# Patient Record
Sex: FEMALE | Race: WHITE | Employment: OTHER | URBAN - METROPOLITAN AREA
[De-identification: names, ages, dates, MRNs, and addresses within clinical notes are randomized per-mention and may not be internally consistent; named-entity substitution may affect disease eponyms.]

---

## 2024-03-27 ENCOUNTER — OFFICE VISIT (OUTPATIENT)
Dept: OTOLARYNGOLOGY | Facility: CLINIC | Age: 69
End: 2024-03-27
Payer: MEDICARE

## 2024-03-27 VITALS — WEIGHT: 183 LBS

## 2024-03-27 DIAGNOSIS — H72.92 PERFORATION OF LEFT TYMPANIC MEMBRANE: Primary | ICD-10-CM

## 2024-03-27 DIAGNOSIS — H90.A32 MIXED CONDUCTIVE AND SENSORINEURAL HEARING LOSS OF LEFT EAR WITH RESTRICTED HEARING OF RIGHT EAR: ICD-10-CM

## 2024-03-27 DIAGNOSIS — H66.3X2 CHRONIC SUPPURATIVE OTITIS MEDIA OF LEFT EAR, UNSPECIFIED OTITIS MEDIA LOCATION: ICD-10-CM

## 2024-03-27 PROCEDURE — 99204 OFFICE O/P NEW MOD 45 MIN: CPT | Performed by: OTOLARYNGOLOGY

## 2024-03-27 PROCEDURE — 1159F MED LIST DOCD IN RCRD: CPT | Performed by: OTOLARYNGOLOGY

## 2024-03-27 PROCEDURE — 1160F RVW MEDS BY RX/DR IN RCRD: CPT | Performed by: OTOLARYNGOLOGY

## 2024-03-27 RX ORDER — FLUTICASONE PROPIONATE 50 MCG
SPRAY, SUSPENSION (ML) NASAL
COMMUNITY

## 2024-03-27 RX ORDER — OXYBUTYNIN CHLORIDE 10 MG/1
10 TABLET, EXTENDED RELEASE ORAL DAILY
COMMUNITY

## 2024-03-27 RX ORDER — ROSUVASTATIN CALCIUM 10 MG/1
10 TABLET, COATED ORAL
COMMUNITY
Start: 2024-01-17

## 2024-03-27 RX ORDER — OMEPRAZOLE 40 MG/1
40 CAPSULE, DELAYED RELEASE ORAL DAILY
COMMUNITY

## 2024-03-27 RX ORDER — CIPROFLOXACIN AND DEXAMETHASONE 3; 1 MG/ML; MG/ML
SUSPENSION/ DROPS AURICULAR (OTIC)
COMMUNITY
Start: 2024-01-18 | End: 2024-05-16 | Stop reason: HOSPADM

## 2024-03-27 RX ORDER — METFORMIN HYDROCHLORIDE 500 MG/1
500 TABLET, EXTENDED RELEASE ORAL
COMMUNITY
Start: 2024-01-17 | End: 2024-04-16

## 2024-03-27 RX ORDER — ALBUTEROL SULFATE 90 UG/1
AEROSOL, METERED RESPIRATORY (INHALATION)
COMMUNITY
Start: 2022-06-02

## 2024-03-27 RX ORDER — HYDROCHLOROTHIAZIDE 25 MG/1
TABLET ORAL
COMMUNITY
Start: 2023-09-18

## 2024-03-27 RX ORDER — METOPROLOL SUCCINATE 100 MG/1
TABLET, EXTENDED RELEASE ORAL
COMMUNITY
Start: 2023-09-18

## 2024-03-27 ASSESSMENT — PATIENT HEALTH QUESTIONNAIRE - PHQ9
1. LITTLE INTEREST OR PLEASURE IN DOING THINGS: NOT AT ALL
2. FEELING DOWN, DEPRESSED OR HOPELESS: NOT AT ALL
SUM OF ALL RESPONSES TO PHQ9 QUESTIONS 1 AND 2: 0

## 2024-03-27 NOTE — PROGRESS NOTES
Reason for Consult:  Left  tympanic membrane perforation      Subjective   History Of Present Illness:  Wendy Ayala is a 68 y.o. female who presents for evaluation for left ear drum perforation. Pt with PMHx of right meningocele/CSF leak repaired about 5 years ago and subsequent tympanoplasty by Dr. Forbes. About two years ago started having recurrent fluid/infection in left ear for which a tube was placed by Dr. Zuleta. Tube initially helped but began getting infected and was then removed about 5 weeks ago. On re-evaluation Dr. Zuleta noticed persistent perforation of left ear and was sent for further eval and possible tympanoplasty. Pt currently denies otorrhea, dizzines or vestibular symptoms. She wears hearing aids for hearing loss.      Past Medical History:  She has no past medical history on file.    Surgical History:  She has no past surgical history on file.     Social History:  She reports that she has never smoked. She has been exposed to tobacco smoke. She has never used smokeless tobacco. No history on file for alcohol use and drug use.    Family History:  family history is not on file.     Medications:  Current Outpatient Medications   Medication Instructions    albuterol 90 mcg/actuation inhaler INHALE 2 PUFFS BY MOUTH EVERY 4 HOURS as instructed AS NEEDED FOR WHEEZING, SHORTNESS OF BREATH    ciprofloxacin-dexamethasone (CiproDEX) otic suspension INSTILL 4 DROPS INTO AFFECTED EAR TWICE DAILY FOR 7 DAYS    fluticasone (Flonase) 50 mcg/actuation nasal spray 16    hydroCHLOROthiazide (HYDRODiuril) 25 mg tablet 90    metFORMIN XR (GLUCOPHAGE-XR) 500 mg, oral, Daily RT    metoprolol succinate XL (Toprol-XL) 100 mg 24 hr tablet 90    omeprazole (PRILOSEC) 40 mg, oral, Daily    oxybutynin XL (DITROPAN-XL) 10 mg, oral, Daily    rosuvastatin (CRESTOR) 10 mg, oral, Daily RT      Allergies:  Patient has no known allergies.    Review of Systems:   A comprehensive 10-point review of systems was  obtained including constitutional, neurological, HEENT, pulmonary, cardiovascular, genito-urinary, and other pertinent systems and was negative except as noted in the HPI.     Objective   Physical Exam:  Last Recorded Vitals: Weight 83 kg (183 lb).    On physical exam, the patient is a well-nourished, well-developed patient, in no acute distress, able to communicate without assistance in English language. Head and face is atraumatic and normocephalic. Salivary glands are intact. Facial strength is symmetrical bilaterally.       On ear examination:  Right ear: The patient has cerumen impaction removed. Once removed he has an open and patent ear canal. The tympanic membrane is intact s/p tympanoplasty. AC>BC.  Left ear: The patient has an open and patent ear canal. The tympanic membrane  has a 50% anterior perforation . BC>AC  [place] is left.    The rest of the exam, including anterior rhinoscopy, oropharyngeal exam, neck exam, and cardiovascular exam, were normal including no palpable lymphadenopathies, thyroid in the midline position, normal pulses, and normal chest excursion.       Reviewed Results:  Audiology Testing:   I personally reviewed the audiogram from 01/2024 from Dr. Zuleta which showed a moderate severe sloping to severe mixed hearing loss in the left ear. And 20-25 dB of air-bone gap. The right ear was not tested.        Imaging:  Has not had recent CT scan        Procedure:  A microscope was used to visualize the ear canal. Mild cerumen removed from right ear with alligator forceps. The left ear cerumen was cleared with alligator forceps and rothman needle. All anatomical structures were intact following the procedure. Pt tolerated well.     Assessment/Plan     1. Perforation of left tympanic membrane    2. Chronic suppurative otitis media of left ear, unspecified otitis media location    3. 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   Given her hx of encephalocele and CSF leak repair by Timo Santana on right and tympanic membrane perforation on left, we will obtain CT IAC to rule out any middle ear pathology. She will likely need tympanoplasty on left side. Follow up in 3 weeks virtually to review CT IAC and discuss surgery.         ____________________________________________________  Richie Javier MD  Professor and Chief   Otology/Neurotology/Lateral Skull-Base Surgery   [facility]

## 2024-03-27 NOTE — LETTER
March 31, 2024     Jose Zuleta MD  [address]  [facility] [address]    Patient: Wendy Ayala   YOB: 1955   Date of Visit: 3/27/2024       Dear Dr. Jose Zuleta MD:    Thank you for referring Wendy Ayala to me for evaluation. Below are my notes for this consultation.  If you have questions, please do not hesitate to call me. I look forward to following your patient along with you.       Sincerely,     Richie Rasmussen MD      CC: Fide Roca MD  ______________________________________________________________________________________            Reason for Consult:  Left  tympanic membrane perforation      Subjective  History Of Present Illness:  Wendy Ayala is a 68 y.o. female who presents for evaluation for left ear drum perforation. Pt with PMHx of right meningocele/CSF leak repaired about 5 years ago and subsequent tympanoplasty by Dr. Forbes. About two years ago started having recurrent fluid/infection in left ear for which a tube was placed by Dr. Zuleta. Tube initially helped but began getting infected and was then removed about 5 weeks ago. On re-evaluation Dr. Zuleta noticed persistent perforation of left ear and was sent for further eval and possible tympanoplasty. Pt currently denies otorrhea, dizzines or vestibular symptoms. She wears hearing aids for hearing loss.      Past Medical History:  She has no past medical history on file.    Surgical History:  She has no past surgical history on file.     Social History:  She reports that she has never smoked. She has been exposed to tobacco smoke. She has never used smokeless tobacco. No history on file for alcohol use and drug use.    Family History:  family history is not on file.     Medications:  Current Outpatient Medications   Medication Instructions   • albuterol 90 mcg/actuation inhaler INHALE 2 PUFFS BY MOUTH EVERY 4 HOURS as instructed AS NEEDED FOR WHEEZING, SHORTNESS OF BREATH   •  ciprofloxacin-dexamethasone (CiproDEX) otic suspension INSTILL 4 DROPS INTO AFFECTED EAR TWICE DAILY FOR 7 DAYS   • fluticasone (Flonase) 50 mcg/actuation nasal spray 16   • hydroCHLOROthiazide (HYDRODiuril) 25 mg tablet 90   • metFORMIN XR (GLUCOPHAGE-XR) 500 mg, oral, Daily RT   • metoprolol succinate XL (Toprol-XL) 100 mg 24 hr tablet 90   • omeprazole (PRILOSEC) 40 mg, oral, Daily   • oxybutynin XL (DITROPAN-XL) 10 mg, oral, Daily   • rosuvastatin (CRESTOR) 10 mg, oral, Daily RT      Allergies:  Patient has no known allergies.    Review of Systems:   A comprehensive 10-point review of systems was obtained including constitutional, neurological, HEENT, pulmonary, cardiovascular, genito-urinary, and other pertinent systems and was negative except as noted in the HPI.     Objective  Physical Exam:  Last Recorded Vitals: Weight 83 kg (183 lb).    On physical exam, the patient is a well-nourished, well-developed patient, in no acute distress, able to communicate without assistance in English language. Head and face is atraumatic and normocephalic. Salivary glands are intact. Facial strength is symmetrical bilaterally.       On ear examination:  Right ear: The patient has cerumen impaction removed. Once removed he has an open and patent ear canal. The tympanic membrane is intact s/p tympanoplasty. AC>BC.  Left ear: The patient has an open and patent ear canal. The tympanic membrane  has a 50% anterior perforation . BC>AC  [place] is left.    The rest of the exam, including anterior rhinoscopy, oropharyngeal exam, neck exam, and cardiovascular exam, were normal including no palpable lymphadenopathies, thyroid in the midline position, normal pulses, and normal chest excursion.       Reviewed Results:  Audiology Testing:   I personally reviewed the audiogram from 01/2024 from Dr. Zuleta which showed a moderate severe sloping to severe mixed hearing loss in the left ear. And 20-25 dB of air-bone gap. The right ear was not  tested.        Imaging:  Has not had recent CT scan        Procedure:  A microscope was used to visualize the ear canal. Mild cerumen removed from right ear with alligator forceps. The left ear cerumen was cleared with alligator forceps and rothman needle. All anatomical structures were intact following the procedure. Pt tolerated well.     Assessment/Plan    1. Perforation of left tympanic membrane    2. Chronic suppurative otitis media of left ear, unspecified otitis media location    3. Mixed conductive and sensorineural hearing loss of left ear with restricted hearing of right ear        In summary, Wendy Ayala is a 68 y.o. 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   Given her hx of encephalocele and CSF leak repair by Timo Santana on right and tympanic membrane perforation on left, we will obtain CT IAC to rule out any middle ear pathology. She will likely need tympanoplasty on left side. Follow up in 3 weeks virtually to review CT IAC and discuss surgery.         ____________________________________________________  Richie Javier MD  Professor and Chief   Otology/Neurotology/Lateral Skull-Base Surgery   [facility]

## 2024-04-10 ENCOUNTER — HOSPITAL ENCOUNTER (OUTPATIENT)
Dept: RADIOLOGY | Facility: CLINIC | Age: 69
Discharge: HOME | End: 2024-04-10
Payer: MEDICARE

## 2024-04-10 DIAGNOSIS — H66.3X2 CHRONIC SUPPURATIVE OTITIS MEDIA OF LEFT EAR, UNSPECIFIED OTITIS MEDIA LOCATION: ICD-10-CM

## 2024-04-10 PROCEDURE — 70480 CT ORBIT/EAR/FOSSA W/O DYE: CPT

## 2024-04-10 PROCEDURE — 70480 CT ORBIT/EAR/FOSSA W/O DYE: CPT | Performed by: RADIOLOGY

## 2024-04-13 NOTE — RESULT ENCOUNTER NOTE
I reviewed The CT of the IAC that showed the previous craniotomy repair on the right side.  She seems to have , bilateral superior canal dehiscence worse on the right than the left. This could explain her mixed hearing loss in addition to the tympanic membrane perforation. He ossicles are intact.    I will discuss results with the patient in her follow up appt. And discuss plan. She will need a New Audio in her next appt. (04/22) please schedule.

## 2024-04-16 ENCOUNTER — CLINICAL SUPPORT (OUTPATIENT)
Dept: AUDIOLOGY | Facility: CLINIC | Age: 69
End: 2024-04-16
Payer: MEDICARE

## 2024-04-16 DIAGNOSIS — H72.92 PERFORATION OF LEFT TYMPANIC MEMBRANE: ICD-10-CM

## 2024-04-16 DIAGNOSIS — H90.3 SENSORINEURAL HEARING LOSS (SNHL) OF BOTH EARS: Primary | ICD-10-CM

## 2024-04-16 PROCEDURE — 92557 COMPREHENSIVE HEARING TEST: CPT | Performed by: AUDIOLOGIST

## 2024-04-16 PROCEDURE — 92567 TYMPANOMETRY: CPT | Performed by: AUDIOLOGIST

## 2024-04-16 NOTE — PROGRESS NOTES
Name: Wendy Ayala  YOB: 1955  Age: 68 y.o.    Date of Evaluation:  4/16/2024      History:      Patient presents today for hearing test. Will see ENT as scheduled virtually 4/22/24.  Patient has history of left eardrum perforation, known bilateral hearing loss.   History of CSF leak, repair.   Wears hearing aids bilaterally.  Denies tinnitus, vertigo.     Evaluation:    Otoscopy revealed clear ear canals bilaterally.  Left tympanic membrane perforation visualized.  Immittance testing revealed normal middle ear function right ear, flat Type B tympanogram with large ear canal volume left ear.  Right ear: mild/moderate  to severe sensorineural hearing loss with good word discrimination (84%)  Left ear: mild/moderate  to profound sensorineural hearing loss with poor word discrimination (44%)      Treatment Plan:    - Follow up with Dr. Javier  - Continue amplification use as directed  - Retest hearing in conjunction with otologic management      [id number]    Ramon Staley, CCC-A

## 2024-04-22 ENCOUNTER — TELEMEDICINE (OUTPATIENT)
Dept: OTOLARYNGOLOGY | Facility: CLINIC | Age: 69
End: 2024-04-22
Payer: MEDICARE

## 2024-04-22 DIAGNOSIS — Z01.818 PREOPERATIVE TESTING: ICD-10-CM

## 2024-04-22 DIAGNOSIS — H72.92 PERFORATION OF LEFT TYMPANIC MEMBRANE: Primary | ICD-10-CM

## 2024-04-22 DIAGNOSIS — H90.A21 SENSORINEURAL HEARING LOSS (SNHL) OF RIGHT EAR WITH RESTRICTED HEARING OF LEFT EAR: ICD-10-CM

## 2024-04-22 DIAGNOSIS — H90.A32 MIXED CONDUCTIVE AND SENSORINEURAL HEARING LOSS OF LEFT EAR WITH RESTRICTED HEARING OF RIGHT EAR: ICD-10-CM

## 2024-04-22 DIAGNOSIS — H83.8X3 SUPERIOR SEMICIRCULAR CANAL DEHISCENCE OF BOTH EARS: ICD-10-CM

## 2024-04-22 PROCEDURE — 1159F MED LIST DOCD IN RCRD: CPT | Performed by: OTOLARYNGOLOGY

## 2024-04-22 PROCEDURE — 99214 OFFICE O/P EST MOD 30 MIN: CPT | Performed by: OTOLARYNGOLOGY

## 2024-04-22 PROCEDURE — 1036F TOBACCO NON-USER: CPT | Performed by: OTOLARYNGOLOGY

## 2024-04-22 PROCEDURE — 1160F RVW MEDS BY RX/DR IN RCRD: CPT | Performed by: OTOLARYNGOLOGY

## 2024-04-22 RX ORDER — SODIUM CHLORIDE 9 MG/ML
100 INJECTION, SOLUTION INTRAVENOUS CONTINUOUS
Status: CANCELLED | OUTPATIENT
Start: 2024-04-22

## 2024-04-22 RX ORDER — CEFAZOLIN SODIUM 2 G/100ML
2 INJECTION, SOLUTION INTRAVENOUS ONCE
Status: CANCELLED | OUTPATIENT
Start: 2024-04-22 | End: 2024-04-22

## 2024-04-22 NOTE — PROGRESS NOTES
Reason for Consult:  Left  tympanic membrane perforation      Subjective   History Of Present Illness:  Wendy Ayala is a 68 y.o. female who presents for evaluation for left ear drum perforation. Pt with PMHx of right meningocele/CSF leak repaired about 5 years ago and subsequent tympanoplasty by Dr. Forbes. About two years ago started having recurrent fluid/infection in left ear for which a tube was placed by Dr. Zuleta. Tube initially helped but began getting infected and was then removed about 5 weeks ago. On re-evaluation Dr. Zuleta noticed persistent perforation of left ear and was sent for further eval and possible tympanoplasty. Pt currently denies otorrhea, dizzines or vestibular symptoms. She wears hearing aids for hearing loss.    In the last visit I ordered a CT and she is here to discuss results and plan.     Past Medical History:  She has no past medical history on file.    Surgical History:  She has no past surgical history on file.     Social History:  She reports that she has never smoked. She has been exposed to tobacco smoke. She has never used smokeless tobacco. No history on file for alcohol use and drug use.    Family History:  family history is not on file.     Medications:  Current Outpatient Medications   Medication Instructions    albuterol 90 mcg/actuation inhaler INHALE 2 PUFFS BY MOUTH EVERY 4 HOURS as instructed AS NEEDED FOR WHEEZING, SHORTNESS OF BREATH    ciprofloxacin-dexamethasone (CiproDEX) otic suspension INSTILL 4 DROPS INTO AFFECTED EAR TWICE DAILY FOR 7 DAYS    fluticasone (Flonase) 50 mcg/actuation nasal spray 16    hydroCHLOROthiazide (HYDRODiuril) 25 mg tablet 90    metFORMIN XR (GLUCOPHAGE-XR) 500 mg, oral, Daily RT    metoprolol succinate XL (Toprol-XL) 100 mg 24 hr tablet 90    omeprazole (PRILOSEC) 40 mg, oral, Daily    oxybutynin XL (DITROPAN-XL) 10 mg, oral, Daily    rosuvastatin (CRESTOR) 10 mg, oral, Daily RT      Allergies:  Patient has no known  allergies.    Review of Systems:   A comprehensive 10-point review of systems was obtained including constitutional, neurological, HEENT, pulmonary, cardiovascular, genito-urinary, and other pertinent systems and was negative except as noted in the HPI.     Objective   Physical Exam:  On physical exam, the patient is a well-nourished well-developed patient, in no acute distress able to communicate with assistance in English language.  Head and face is atraumatic and normocephalic, facial strength is symmetrical bilaterally.    On ear examination: Normal Pinna. No further examination performed as this was virtual visit.    On Neuro exam, the patient is alert and oriented x3, cranial nerves are grossly intact.    No further examination performed as this was a virtual visit.       Reviewed Results:  Audiology Testing:   I personally reviewed the audiogram from 04/2024 which showed a moderate severe sloping to severe mixed hearing loss bilateralwith  15 dB of air-bone gap in the low frequencies. She has 44% discrimination on the left and 84% on the right.       I personally reviewed the audiogram from 01/2024 from Dr. Zuleta which showed a moderate severe sloping to severe mixed hearing loss in the left ear. And 20-25 dB of air-bone gap. The right ear was not tested.        Imaging:  I reviewed The CT of the IAC from 04/2024 that showed the previous craniotomy repair on the right side.  She seems to have , bilateral superior canal dehiscence worse on the right than the left. This could explain her mixed hearing loss in addition to the tympanic membrane perforation. He ossicles are intact.        Procedure:  None    Assessment/Plan     1. Perforation of left tympanic membrane    2. 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 Sensorineural hearing loss (SNHL) of right ear with restricted hearing of left ear    4. 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    She has a hx of encephalocele and CSF leak repair by Timo Santana on right.    The recent CT showed bilateral superior canal dehiscences.  She also has 44% discrimination on the left compared to the right which is 84%.    I discussed with her and her  that fixing the perforation might improve some of the fullness sensation and pure tone hearing but it is unlikely that he will improve her speech understanding.  I also explained that there is a chance that the low frequency air-bone gap might not improve due to the superior canal dehiscence.    The patient is still interested in surgery and we will plan on doing a left-sided postauricular tympanoplasty with composite cartilage graft.  The risks, indications, and complications of surgery were discussed with the patient and she elected to proceed.  We will schedule this in near future.       ____________________________________________________  Richie Javier MD  Professor and Chief   Otology/Neurotology/Lateral Skull-Base Surgery   [facility]

## 2024-04-22 NOTE — Clinical Note
I was able to connect. No need to reschedule. She is ready for surgery. Please schedule in [facility] or [place]. Orders are in. [facility]

## 2024-04-22 NOTE — LETTER
April 22, 2024     Jose Zuleta MD  [address]  [facility] [address]    Patient: Wendy Ayala   YOB: 1955   Date of Visit: 4/22/2024       Dear Dr. Jose Zuleta MD:    Thank you for referring Wendy Ayala to me for evaluation. Below are my notes for this consultation.  If you have questions, please do not hesitate to call me. I look forward to following your patient along with you.       Sincerely,     Richie Rasmussen MD      CC: Fide Roca MD  ______________________________________________________________________________________            Reason for Consult:  Left  tympanic membrane perforation      Subjective  History Of Present Illness:  Wendy Ayala is a 68 y.o. female who presents for evaluation for left ear drum perforation. Pt with PMHx of right meningocele/CSF leak repaired about 5 years ago and subsequent tympanoplasty by Dr. Forbes. About two years ago started having recurrent fluid/infection in left ear for which a tube was placed by Dr. Zuleta. Tube initially helped but began getting infected and was then removed about 5 weeks ago. On re-evaluation Dr. Zuleta noticed persistent perforation of left ear and was sent for further eval and possible tympanoplasty. Pt currently denies otorrhea, dizzines or vestibular symptoms. She wears hearing aids for hearing loss.    In the last visit I ordered a CT and she is here to discuss results and plan.     Past Medical History:  She has no past medical history on file.    Surgical History:  She has no past surgical history on file.     Social History:  She reports that she has never smoked. She has been exposed to tobacco smoke. She has never used smokeless tobacco. No history on file for alcohol use and drug use.    Family History:  family history is not on file.     Medications:  Current Outpatient Medications   Medication Instructions   • albuterol 90 mcg/actuation inhaler INHALE 2 PUFFS BY  MOUTH EVERY 4 HOURS as instructed AS NEEDED FOR WHEEZING, SHORTNESS OF BREATH   • ciprofloxacin-dexamethasone (CiproDEX) otic suspension INSTILL 4 DROPS INTO AFFECTED EAR TWICE DAILY FOR 7 DAYS   • fluticasone (Flonase) 50 mcg/actuation nasal spray 16   • hydroCHLOROthiazide (HYDRODiuril) 25 mg tablet 90   • metFORMIN XR (GLUCOPHAGE-XR) 500 mg, oral, Daily RT   • metoprolol succinate XL (Toprol-XL) 100 mg 24 hr tablet 90   • omeprazole (PRILOSEC) 40 mg, oral, Daily   • oxybutynin XL (DITROPAN-XL) 10 mg, oral, Daily   • rosuvastatin (CRESTOR) 10 mg, oral, Daily RT      Allergies:  Patient has no known allergies.    Review of Systems:   A comprehensive 10-point review of systems was obtained including constitutional, neurological, HEENT, pulmonary, cardiovascular, genito-urinary, and other pertinent systems and was negative except as noted in the HPI.     Objective  Physical Exam:  On physical exam, the patient is a well-nourished well-developed patient, in no acute distress able to communicate with assistance in English language.  Head and face is atraumatic and normocephalic, facial strength is symmetrical bilaterally.    On ear examination: Normal Pinna. No further examination performed as this was virtual visit.    On Neuro exam, the patient is alert and oriented x3, cranial nerves are grossly intact.    No further examination performed as this was a virtual visit.       Reviewed Results:  Audiology Testing:   I personally reviewed the audiogram from 04/2024 which showed a moderate severe sloping to severe mixed hearing loss bilateralwith  15 dB of air-bone gap in the low frequencies. She has 44% discrimination on the left and 84% on the right.       I personally reviewed the audiogram from 01/2024 from Dr. Zuleta which showed a moderate severe sloping to severe mixed hearing loss in the left ear. And 20-25 dB of air-bone gap. The right ear was not tested.        Imaging:  I reviewed The CT of the IAC from 04/2024  that showed the previous craniotomy repair on the right side.  She seems to have , bilateral superior canal dehiscence worse on the right than the left. This could explain her mixed hearing loss in addition to the tympanic membrane perforation. He ossicles are intact.        Procedure:  None    Assessment/Plan    1. Perforation of left tympanic membrane    2. 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 Sensorineural hearing loss (SNHL) of right ear with restricted hearing of left ear    4. 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    She has a hx of encephalocele and CSF leak repair by Timo Santana on right.    The recent CT showed bilateral superior canal dehiscences.  She also has 44% discrimination on the left compared to the right which is 84%.    I discussed with her and her  that fixing the perforation might improve some of the fullness sensation and pure tone hearing but it is unlikely that he will improve her speech understanding.  I also explained that there is a chance that the low frequency air-bone gap might not improve due to the superior canal dehiscence.    The patient is still interested in surgery and we will plan on doing a left-sided postauricular tympanoplasty with composite cartilage graft.  The risks, indications, and complications of surgery were discussed with the patient and she elected to proceed.  We will schedule this in near future.       ____________________________________________________  Richie Javier MD  Professor and Chief   Otology/Neurotology/Lateral Skull-Base Surgery   [facility]

## 2024-04-22 NOTE — H&P (VIEW-ONLY)
Reason for Consult:  Left  tympanic membrane perforation      Subjective   History Of Present Illness:  Wendy Ayala is a 68 y.o. female who presents for evaluation for left ear drum perforation. Pt with PMHx of right meningocele/CSF leak repaired about 5 years ago and subsequent tympanoplasty by Dr. Forbes. About two years ago started having recurrent fluid/infection in left ear for which a tube was placed by Dr. Zuleta. Tube initially helped but began getting infected and was then removed about 5 weeks ago. On re-evaluation Dr. Zuleta noticed persistent perforation of left ear and was sent for further eval and possible tympanoplasty. Pt currently denies otorrhea, dizzines or vestibular symptoms. She wears hearing aids for hearing loss.    In the last visit I ordered a CT and she is here to discuss results and plan.     Past Medical History:  She has no past medical history on file.    Surgical History:  She has no past surgical history on file.     Social History:  She reports that she has never smoked. She has been exposed to tobacco smoke. She has never used smokeless tobacco. No history on file for alcohol use and drug use.    Family History:  family history is not on file.     Medications:  Current Outpatient Medications   Medication Instructions    albuterol 90 mcg/actuation inhaler INHALE 2 PUFFS BY MOUTH EVERY 4 HOURS as instructed AS NEEDED FOR WHEEZING, SHORTNESS OF BREATH    ciprofloxacin-dexamethasone (CiproDEX) otic suspension INSTILL 4 DROPS INTO AFFECTED EAR TWICE DAILY FOR 7 DAYS    fluticasone (Flonase) 50 mcg/actuation nasal spray 16    hydroCHLOROthiazide (HYDRODiuril) 25 mg tablet 90    metFORMIN XR (GLUCOPHAGE-XR) 500 mg, oral, Daily RT    metoprolol succinate XL (Toprol-XL) 100 mg 24 hr tablet 90    omeprazole (PRILOSEC) 40 mg, oral, Daily    oxybutynin XL (DITROPAN-XL) 10 mg, oral, Daily    rosuvastatin (CRESTOR) 10 mg, oral, Daily RT      Allergies:  Patient has no known  allergies.    Review of Systems:   A comprehensive 10-point review of systems was obtained including constitutional, neurological, HEENT, pulmonary, cardiovascular, genito-urinary, and other pertinent systems and was negative except as noted in the HPI.     Objective   Physical Exam:  On physical exam, the patient is a well-nourished well-developed patient, in no acute distress able to communicate with assistance in English language.  Head and face is atraumatic and normocephalic, facial strength is symmetrical bilaterally.    On ear examination: Normal Pinna. No further examination performed as this was virtual visit.    On Neuro exam, the patient is alert and oriented x3, cranial nerves are grossly intact.    No further examination performed as this was a virtual visit.       Reviewed Results:  Audiology Testing:   I personally reviewed the audiogram from 04/2024 which showed a moderate severe sloping to severe mixed hearing loss bilateralwith  15 dB of air-bone gap in the low frequencies. She has 44% discrimination on the left and 84% on the right.       I personally reviewed the audiogram from 01/2024 from Dr. Zuleta which showed a moderate severe sloping to severe mixed hearing loss in the left ear. And 20-25 dB of air-bone gap. The right ear was not tested.        Imaging:  I reviewed The CT of the [facility] from 04/2024 that showed the previous craniotomy repair on the right side.  She seems to have , bilateral superior canal dehiscence worse on the right than the left. This could explain her mixed hearing loss in addition to the tympanic membrane perforation. He ossicles are intact.        Procedure:  None    Assessment/Plan     1. Perforation of left tympanic membrane    2. 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 Sensorineural hearing loss (SNHL) of right ear with restricted hearing of left ear    4. 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    She has a hx of encephalocele and CSF leak repair by Timo Santana on right.    The recent CT showed bilateral superior canal dehiscences.  She also has 44% discrimination on the left compared to the right which is 84%.    I discussed with her and her  that fixing the perforation might improve some of the fullness sensation and pure tone hearing but it is unlikely that he will improve her speech understanding.  I also explained that there is a chance that the low frequency air-bone gap might not improve due to the superior canal dehiscence.    The patient is still interested in surgery and we will plan on doing a left-sided postauricular tympanoplasty with composite cartilage graft.  The risks, indications, and complications of surgery were discussed with the patient and she elected to proceed.  We will schedule this in near future.       ____________________________________________________  Richie Javier MD  Professor and Chief   Otology/Neurotology/Lateral Skull-Base Surgery   [facility]

## 2024-04-24 NOTE — PREPROCEDURE INSTRUCTIONS
Pre-Op Instructions & Checklist   Your surgery has been scheduled at [facility] at [address] [place], [address], [address], in the [facility]. Parking is to the left of the main entrance.  You will be contacted about the time of your surgery the day before your surgery. If you are unable to answer the phone, a detailed voicemail message will be left. Make sure that your voicemail box is not full so a message can be left. If you have not received a call by 3:00 pm you may call [phone] between the hours of 3:00 and 4:00 pm. Please be available by phone the night before/day of surgery in case there is a change in the schedule which may require you to arrive earlier/later.    14 DAYS BEFORE SURGERY STOP TAKING WEIGHT LOSS MEDICATIONS      7 DAYS BEFORE SURGERY STOP THESE MEDICATIONS:  Multiple Vitamins containing Vitamin E  Herbal supplements, Fish Oil, garlic pills, turmeric, CoQ enzyme  Stop taking aspirin, and aspirin-containing products as well as NSAID's such as Advil, Motrin, Aleve, Ibuprofen. Tylenol is okay to take for pain relief.   If you are currently taking Coumadin/Warfarin, we will have to coordinate that with your PCP &/or the Anticoagulation Clinic.    THE DAY BEFORE SURGERY:  Do not eat any food after midnight the night before surgery.   You are permitted to have clear liquids such as water, apple juice, plain tea or coffee (no milk or creamer), clear electrolyte-replenishing drinks such as Pedialyte, Gatorade, or Powerade (not yogurt or pulp-containing smoothies or juices such as orange juice) up to 2 hours before your surgery.    DAY OF SURGERY, TAKE THESE MEDICATIONS with a small sip of water (if it is not listed, do not take it):    Take: Omeprazole; metoprolol                ON THE MORNING OF SURGERY:  *Shower either the night before your surgery or the morning of your surgery  *Do not use moisturizers, creams, lotions or perfume, or make-up.  *Wear  comfortable, loose fitting clothing.   *All jewelry and valuables should be left at home.  *Prosthetic devices such as contact lenses, hearing aids, dentures, eyelash extensions, hairpins and body piercings must be removed before surgery. Bring containers for eyeglasses/contacts, dentures, or hearing aids with you.  Diabetics: Please check fasting blood sugars upon waking up.  If fasting blood sugars are <80ml/dl, please drink 100ml/3oz. of apple juice no later than 2 hours prior to surgery.      BRING WITH YOU:   *Photo ID and insurance card  *Current list of medicines and allergies  *Pacemaker/Defibrillator/Heart stent cards  *Copy of your complete Advanced Directive/DHPOA-if applicable     SMOKING:  *Quitting smoking can make a huge difference to your health and recovery from surgery.    *If you need help with quitting, call [phone].  Alcohol:  *No alcoholic beverages for 48 hours before surgery.     AFTER OUTPATIENT SURGERY:  *A responsible adult MUST accompany you at the time of discharge and stay with you for 24 hours after your surgery.  *You may NOT drive yourself home after surgery.  *You may use a taxi or ride sharing service (Lyft, Uber) to return home ONLY if you are to change the date accompanied by a friend or family member.  *Instructions for resuming your medications will be provided by your surgeon.     CONTACT SURGEON'S OFFICE IF YOU DEVELOP:  * Fever =/> 100.4 F   * New respiratory symptoms (e.g. cough, shortness of breath, respiratory distress, sore throat)  * Recent loss of taste or smell  *Flu like symptoms such as headache, fatigue or gastrointestinal symptoms  * If you develop any open sores, shingles, burning or painful urination   AND/OR:  * You no longer wish to have the surgery.  * Any other personal circumstances change that may lead to the need to cancel or defer this surgery.  *You were admitted to any hospital within one week of your planned procedure.     If you have any  questions regarding these preoperative instructions you may call [phone]. If you have questions regarding you surgical procedure, or post-operative care/recovery please call your surgeon's office.

## 2024-04-24 NOTE — CPM/PAT H&P
CPM/PAT Evaluation       Name: Wendy Ayala (Wendy Ayala)  /Age: 1955/68 y.o.     TELEMEDICINE ENCOUNTER  Patient was contacted by telephone for preadmission testing perioperative risk assessment prior to surgery.    CHIEF COMPLAINT  Perforation of left tympanic membrane    HPI  Wendy Ayala is a 68-year-old female with a left eardrum perforation.  She has a history of a right meningocele/CSF leak which was repaired about 5 years ago, and subsequent tympanoplasty.  Two years ago she had an ear tube placement for recurrent fluid in the left ear.  The tube became infected, and was removed about 6 weeks ago.  During clinical examination she was noticed to have a persistent perforation of the left ear tympanic membrane and was referred to ENT specialty for further treatment.  She wears bilateral hearing aids for hearing loss, and denies dizziness, otorrhea, otalgia.  She is scheduled for postauricular tympanoplasty, canalplasty with composite cartilage graft of left ear on 2024.       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     PAST MEDICAL HISTORY  Past Medical History:   Diagnosis Date    Acid reflux     HTN (hypertension)     Type 2 diabetes mellitus (Multi)      SURGICAL HISTORY  Past Surgical History:   Procedure Laterality Date    COLONOSCOPY      OTHER SURGICAL HISTORY      Repair of right meningocele and CFS leak    OTHER SURGICAL HISTORY      Tympanoplasty    OTHER SURGICAL HISTORY      Placement of Jones cristina for scoliosis in     OTHER SURGICAL HISTORY      Repair of rectocele    OTHER SURGICAL HISTORY      Right knee arthroscopy     ANESTHESIA HISTORY  Denies problems with anesthesia in the  past such as PONV, prolonged sedation, awareness, dental damage, aspiration, cardiac arrest, difficult intubation, or unexpected hospital admissions.  Denies family history of malignant hyperthermia, or pseudocholinesterase deficiency.    SOCIAL HISTORY  Never smoker; EtOH: Infrequent glass of wine; denies recreational drug use.  Patient states she goes for walks, and does chair exercises 3 times a week.  She states she is able to do moderate ADLs such as heavy housework (vacuuming, washing floors, pushing aside furniture), and light yard work.  She denies chest pain, NASCIMENTO.  METS 4    FAMILY HISTORY  No family history on file.    ALLERGIES  No Known Allergies    MEDICATIONS  No current facility-administered medications for this encounter.    Current Outpatient Medications:     hydroCHLOROthiazide (HYDRODiuril) 25 mg tablet, 90, Disp: , Rfl:     metoprolol succinate XL (Toprol-XL) 100 mg 24 hr tablet, 90, Disp: , Rfl:     omeprazole (PriLOSEC) 40 mg DR capsule, Take 1 capsule (40 mg) by mouth once daily., Disp: , Rfl:     oxybutynin XL (Ditropan-XL) 10 mg 24 hr tablet, Take 1 tablet (10 mg) by mouth once daily., Disp: , Rfl:     albuterol 90 mcg/actuation inhaler, INHALE 2 PUFFS BY MOUTH EVERY 4 HOURS as instructed AS NEEDED FOR WHEEZING, SHORTNESS OF BREATH, Disp: , Rfl:     ciprofloxacin-dexamethasone (CiproDEX) otic suspension, INSTILL 4 DROPS INTO AFFECTED EAR TWICE DAILY FOR 7 DAYS, Disp: , Rfl:     fluticasone (Flonase) 50 mcg/actuation nasal spray, 16, Disp: , Rfl:     metFORMIN  mg 24 hr tablet, Take 1 tablet (500 mg) by mouth once daily., Disp: , Rfl:     rosuvastatin (Crestor) 10 mg tablet, Take 1 tablet (10 mg) by mouth once daily., Disp: , Rfl:     Review of Systems   HENT:          Perforated tympanic membrane, left ear   All other systems reviewed and are negative.    PHYSICAL EXAM  Deferred    AIRWAY EXAM  Deferred    VITALS  No vitals taken for telemedicine visit  Height: 5 feet 8 inches; weight:  176 pounds; BMI: 26.76    LABS  Contains abnormal data COMP METABOLIC PANEL from 04/17/2024  Specimen: Blood - Blood specimen (specimen)  Component  Ref Range & Units 7 d ago Comments   Protein, Total  6.3 - 8.0 g/dL 7.0    Albumin  3.9 - 4.9 g/dL 4.5    Calcium, Total  8.5 - 10.2 mg/dL 9.8    Bilirubin, Total  0.2 - 1.3 mg/dL 0.5    Alkaline Phosphatase  34 - 123 U/L 63    AST  13 - 35 U/L 19    ALT  7 - 38 U/L 22    Glucose  74 - 99 mg/dL 121 High  The American Diabetes Association (ADA) provides guidance for cutoff values for fasting glucose and random glucose. The ADA defines fasting as no caloric intake for at least 8 hours. Fasting plasma glucose results between 100 to 125 mg/dL indicate increased risk for diabetes (prediabetes).  Fasting plasma glucose results greater than or equal to 126 mg/dL meet the criteria for diagnosis of diabetes. In the absence of unequivocal hyperglycemia, results should be confirmed by repeat testing. In a patient with classic symptoms of hyperglycemia or hyperglycemic crisis, random plasma glucose results greater than or equal to 200 mg/dL meet the criteria for diagnosis of diabetes.  Reference: Standards of Medical Care in Diabetes 2016, American Diabetes Association. Diabetes Care. 2016.39(Suppl 1).   BUN  7 - 21 mg/dL 19    Creatinine  0.58 - 0.96 mg/dL 0.95    Sodium  136 - 144 mmol/L 140    Potassium  3.7 - 5.1 mmol/L 3.7    Chloride  97 - 105 mmol/L 105    CO2  22 - 30 mmol/L 27    Anion Gap  9 - 18 mmol/L 8 Low     Estimated Glomerular Filtration Rate  >=60 mL/min/1.73m² 66        HGB A1C from 04/17/2024  Specimen: Blood - Blood specimen (specimen)  Component  Ref Range & Units 7 d ago Comments   Hemoglobin A1C  4.3 - 6.1 % 5.9 American Diabetes Association guidelines indicate that patients with HgbA1c in the range 5.7-6.4% are at increased risk for development of diabetes, and intervention by lifestyle modification may be beneficial. HgbA1c greater or equal to 6.5% is  considered diagnostic of diabetes.   Estimated Average Glucose  mg/dL 123        Orders for CBC, CMP and EKG placed by Dr. Javier.  Patient expressed understanding that these orders are to be completed at least 1 week prior to surgery.      ASSESSMENT/PLAN  Tympanic membrane perforation, left ear  Postauricular tympanoplasty, canalplasty with composite cartilage graft      This note was created in part upon personal review of patient's medical records.  Speech recognition transcription software was used in the creation of this note. Despite proofreading, several typographical errors might be present that might affect the meaning of the content.

## 2024-05-06 ENCOUNTER — LAB (OUTPATIENT)
Dept: LAB | Facility: LAB | Age: 69
End: 2024-05-06
Payer: MEDICARE

## 2024-05-06 ENCOUNTER — HOSPITAL ENCOUNTER (OUTPATIENT)
Dept: CARDIOLOGY | Facility: CLINIC | Age: 69
Discharge: HOME | End: 2024-05-06
Payer: MEDICARE

## 2024-05-06 DIAGNOSIS — Z01.818 PREOPERATIVE TESTING: ICD-10-CM

## 2024-05-06 PROCEDURE — 85027 COMPLETE CBC AUTOMATED: CPT

## 2024-05-06 PROCEDURE — 36415 COLL VENOUS BLD VENIPUNCTURE: CPT

## 2024-05-06 PROCEDURE — 93005 ELECTROCARDIOGRAM TRACING: CPT

## 2024-05-06 PROCEDURE — 80053 COMPREHEN METABOLIC PANEL: CPT

## 2024-05-07 LAB
ALBUMIN SERPL BCP-MCNC: 4.3 G/DL (ref 3.4–5)
ALP SERPL-CCNC: 55 U/L (ref 33–136)
ALT SERPL W P-5'-P-CCNC: 23 U/L (ref 7–45)
ANION GAP SERPL CALC-SCNC: 13 MMOL/L (ref 10–20)
AST SERPL W P-5'-P-CCNC: 17 U/L (ref 9–39)
BILIRUB SERPL-MCNC: 0.7 MG/DL (ref 0–1.2)
BUN SERPL-MCNC: 19 MG/DL (ref 6–23)
CALCIUM SERPL-MCNC: 9.2 MG/DL (ref 8.6–10.6)
CHLORIDE SERPL-SCNC: 102 MMOL/L (ref 98–107)
CO2 SERPL-SCNC: 29 MMOL/L (ref 21–32)
CREAT SERPL-MCNC: 0.84 MG/DL (ref 0.5–1.05)
EGFRCR SERPLBLD CKD-EPI 2021: 77 ML/MIN/1.73M*2
ERYTHROCYTE [DISTWIDTH] IN BLOOD BY AUTOMATED COUNT: 13.1 % (ref 11.5–14.5)
GLUCOSE SERPL-MCNC: 119 MG/DL (ref 74–99)
HCT VFR BLD AUTO: 38.4 % (ref 36–46)
HGB BLD-MCNC: 12.4 G/DL (ref 12–16)
MCH RBC QN AUTO: 30.2 PG (ref 26–34)
MCHC RBC AUTO-ENTMCNC: 32.3 G/DL (ref 32–36)
MCV RBC AUTO: 94 FL (ref 80–100)
NRBC BLD-RTO: 0 /100 WBCS (ref 0–0)
PLATELET # BLD AUTO: 194 X10*3/UL (ref 150–450)
POTASSIUM SERPL-SCNC: 3.7 MMOL/L (ref 3.5–5.3)
PROT SERPL-MCNC: 6.5 G/DL (ref 6.4–8.2)
RBC # BLD AUTO: 4.1 X10*6/UL (ref 4–5.2)
SODIUM SERPL-SCNC: 140 MMOL/L (ref 136–145)
WBC # BLD AUTO: 5.4 X10*3/UL (ref 4.4–11.3)

## 2024-05-13 ENCOUNTER — TELEPHONE (OUTPATIENT)
Dept: OTOLARYNGOLOGY | Facility: HOSPITAL | Age: 69
End: 2024-05-13
Payer: MEDICARE

## 2024-05-13 NOTE — TELEPHONE ENCOUNTER
Patient called c/o congestion. Wanted to let us know before surgery on Thursday. RN suggested tylenol and OTC cold medication. No fever reported.

## 2024-05-15 ENCOUNTER — ANESTHESIA EVENT (OUTPATIENT)
Dept: OPERATING ROOM | Facility: CLINIC | Age: 69
End: 2024-05-15
Payer: MEDICARE

## 2024-05-16 ENCOUNTER — HOSPITAL ENCOUNTER (OUTPATIENT)
Facility: CLINIC | Age: 69
Setting detail: OUTPATIENT SURGERY
Discharge: HOME | End: 2024-05-16
Attending: OTOLARYNGOLOGY | Admitting: OTOLARYNGOLOGY
Payer: MEDICARE

## 2024-05-16 ENCOUNTER — ANESTHESIA (OUTPATIENT)
Dept: OPERATING ROOM | Facility: CLINIC | Age: 69
End: 2024-05-16
Payer: MEDICARE

## 2024-05-16 VITALS
HEIGHT: 68 IN | SYSTOLIC BLOOD PRESSURE: 140 MMHG | OXYGEN SATURATION: 94 % | BODY MASS INDEX: 26.06 KG/M2 | DIASTOLIC BLOOD PRESSURE: 69 MMHG | TEMPERATURE: 97.3 F | WEIGHT: 171.96 LBS | RESPIRATION RATE: 16 BRPM | HEART RATE: 60 BPM

## 2024-05-16 DIAGNOSIS — H90.A32 MIXED CONDUCTIVE AND SENSORINEURAL HEARING LOSS OF LEFT EAR WITH RESTRICTED HEARING OF RIGHT EAR: ICD-10-CM

## 2024-05-16 DIAGNOSIS — H72.92 PERFORATION OF LEFT TYMPANIC MEMBRANE: Primary | ICD-10-CM

## 2024-05-16 PROBLEM — E11.9 DIABETES MELLITUS, TYPE 2 (MULTI): Status: ACTIVE | Noted: 2024-05-16

## 2024-05-16 PROBLEM — I10 HTN (HYPERTENSION): Status: ACTIVE | Noted: 2024-05-16

## 2024-05-16 LAB — GLUCOSE BLD MANUAL STRIP-MCNC: 122 MG/DL (ref 74–99)

## 2024-05-16 PROCEDURE — 7100000009 HC PHASE TWO TIME - INITIAL BASE CHARGE: Performed by: OTOLARYNGOLOGY

## 2024-05-16 PROCEDURE — 7100000001 HC RECOVERY ROOM TIME - INITIAL BASE CHARGE: Performed by: OTOLARYNGOLOGY

## 2024-05-16 PROCEDURE — 2500000004 HC RX 250 GENERAL PHARMACY W/ HCPCS (ALT 636 FOR OP/ED)

## 2024-05-16 PROCEDURE — 95867 NDL EMG CRANIAL NRV MUSC UNI: CPT | Performed by: OTOLARYNGOLOGY

## 2024-05-16 PROCEDURE — 3600000008 HC OR TIME - EACH INCREMENTAL 1 MINUTE - PROCEDURE LEVEL THREE: Performed by: OTOLARYNGOLOGY

## 2024-05-16 PROCEDURE — 3700000002 HC GENERAL ANESTHESIA TIME - EACH INCREMENTAL 1 MINUTE: Performed by: OTOLARYNGOLOGY

## 2024-05-16 PROCEDURE — 3600000003 HC OR TIME - INITIAL BASE CHARGE - PROCEDURE LEVEL THREE: Performed by: OTOLARYNGOLOGY

## 2024-05-16 PROCEDURE — 2500000001 HC RX 250 WO HCPCS SELF ADMINISTERED DRUGS (ALT 637 FOR MEDICARE OP): Performed by: OTOLARYNGOLOGY

## 2024-05-16 PROCEDURE — 2500000005 HC RX 250 GENERAL PHARMACY W/O HCPCS: Performed by: OTOLARYNGOLOGY

## 2024-05-16 PROCEDURE — 7100000002 HC RECOVERY ROOM TIME - EACH INCREMENTAL 1 MINUTE: Performed by: OTOLARYNGOLOGY

## 2024-05-16 PROCEDURE — 2500000004 HC RX 250 GENERAL PHARMACY W/ HCPCS (ALT 636 FOR OP/ED): Performed by: OTOLARYNGOLOGY

## 2024-05-16 PROCEDURE — 15760 COMPOSITE SKIN GRAFT: CPT | Performed by: OTOLARYNGOLOGY

## 2024-05-16 PROCEDURE — A4217 STERILE WATER/SALINE, 500 ML: HCPCS | Performed by: OTOLARYNGOLOGY

## 2024-05-16 PROCEDURE — 82947 ASSAY GLUCOSE BLOOD QUANT: CPT

## 2024-05-16 PROCEDURE — 2720000007 HC OR 272 NO HCPCS: Performed by: OTOLARYNGOLOGY

## 2024-05-16 PROCEDURE — 7100000010 HC PHASE TWO TIME - EACH INCREMENTAL 1 MINUTE: Performed by: OTOLARYNGOLOGY

## 2024-05-16 PROCEDURE — 2500000004 HC RX 250 GENERAL PHARMACY W/ HCPCS (ALT 636 FOR OP/ED): Performed by: ANESTHESIOLOGY

## 2024-05-16 PROCEDURE — 2500000002 HC RX 250 W HCPCS SELF ADMINISTERED DRUGS (ALT 637 FOR MEDICARE OP, ALT 636 FOR OP/ED): Performed by: OTOLARYNGOLOGY

## 2024-05-16 PROCEDURE — A69631 PR TYMPANOPLASTY: Performed by: NURSE ANESTHETIST, CERTIFIED REGISTERED

## 2024-05-16 PROCEDURE — 2500000005 HC RX 250 GENERAL PHARMACY W/O HCPCS

## 2024-05-16 PROCEDURE — C1729 CATH, DRAINAGE: HCPCS | Performed by: OTOLARYNGOLOGY

## 2024-05-16 PROCEDURE — A69631 PR TYMPANOPLASTY: Performed by: ANESTHESIOLOGY

## 2024-05-16 PROCEDURE — 69631 REPAIR EARDRUM STRUCTURES: CPT | Performed by: OTOLARYNGOLOGY

## 2024-05-16 PROCEDURE — 3700000001 HC GENERAL ANESTHESIA TIME - INITIAL BASE CHARGE: Performed by: OTOLARYNGOLOGY

## 2024-05-16 RX ORDER — LIDOCAINE HYDROCHLORIDE 20 MG/ML
INJECTION, SOLUTION INFILTRATION; PERINEURAL AS NEEDED
Status: DISCONTINUED | OUTPATIENT
Start: 2024-05-16 | End: 2024-05-16

## 2024-05-16 RX ORDER — SODIUM CHLORIDE 0.9 G/100ML
IRRIGANT IRRIGATION AS NEEDED
Status: DISCONTINUED | OUTPATIENT
Start: 2024-05-16 | End: 2024-05-16 | Stop reason: HOSPADM

## 2024-05-16 RX ORDER — ROCURONIUM BROMIDE 10 MG/ML
INJECTION, SOLUTION INTRAVENOUS AS NEEDED
Status: DISCONTINUED | OUTPATIENT
Start: 2024-05-16 | End: 2024-05-16

## 2024-05-16 RX ORDER — FENTANYL CITRATE 50 UG/ML
INJECTION, SOLUTION INTRAMUSCULAR; INTRAVENOUS AS NEEDED
Status: DISCONTINUED | OUTPATIENT
Start: 2024-05-16 | End: 2024-05-16

## 2024-05-16 RX ORDER — NORETHINDRONE AND ETHINYL ESTRADIOL 0.5-0.035
KIT ORAL AS NEEDED
Status: DISCONTINUED | OUTPATIENT
Start: 2024-05-16 | End: 2024-05-16

## 2024-05-16 RX ORDER — FENTANYL CITRATE 50 UG/ML
25 INJECTION, SOLUTION INTRAMUSCULAR; INTRAVENOUS EVERY 5 MIN PRN
Status: DISCONTINUED | OUTPATIENT
Start: 2024-05-16 | End: 2024-05-16 | Stop reason: HOSPADM

## 2024-05-16 RX ORDER — EPINEPHRINE 1 MG/ML
INJECTION, SOLUTION, CONCENTRATE INTRAVENOUS AS NEEDED
Status: DISCONTINUED | OUTPATIENT
Start: 2024-05-16 | End: 2024-05-16 | Stop reason: HOSPADM

## 2024-05-16 RX ORDER — PROPOFOL 10 MG/ML
INJECTION, EMULSION INTRAVENOUS AS NEEDED
Status: DISCONTINUED | OUTPATIENT
Start: 2024-05-16 | End: 2024-05-16

## 2024-05-16 RX ORDER — APREPITANT 40 MG/1
40 CAPSULE ORAL ONCE
Status: DISCONTINUED | OUTPATIENT
Start: 2024-05-16 | End: 2024-05-16 | Stop reason: HOSPADM

## 2024-05-16 RX ORDER — LIDOCAINE HYDROCHLORIDE AND EPINEPHRINE 10; 10 MG/ML; UG/ML
INJECTION, SOLUTION INFILTRATION; PERINEURAL AS NEEDED
Status: DISCONTINUED | OUTPATIENT
Start: 2024-05-16 | End: 2024-05-16 | Stop reason: HOSPADM

## 2024-05-16 RX ORDER — METOCLOPRAMIDE HYDROCHLORIDE 5 MG/ML
10 INJECTION INTRAMUSCULAR; INTRAVENOUS ONCE AS NEEDED
Status: DISCONTINUED | OUTPATIENT
Start: 2024-05-16 | End: 2024-05-16 | Stop reason: HOSPADM

## 2024-05-16 RX ORDER — LIDOCAINE IN NACL,ISO-OSMOT/PF 30 MG/3 ML
0.1 SYRINGE (ML) INJECTION ONCE
Status: DISCONTINUED | OUTPATIENT
Start: 2024-05-16 | End: 2024-05-16 | Stop reason: HOSPADM

## 2024-05-16 RX ORDER — GLYCOPYRROLATE 0.2 MG/ML
INJECTION INTRAMUSCULAR; INTRAVENOUS AS NEEDED
Status: DISCONTINUED | OUTPATIENT
Start: 2024-05-16 | End: 2024-05-16

## 2024-05-16 RX ORDER — ONDANSETRON 4 MG/1
4 TABLET, ORALLY DISINTEGRATING ORAL EVERY 8 HOURS PRN
Qty: 20 TABLET | Refills: 0 | Status: SHIPPED | OUTPATIENT
Start: 2024-05-16

## 2024-05-16 RX ORDER — IBUPROFEN 600 MG/1
600 TABLET ORAL EVERY 6 HOURS PRN
Start: 2024-05-16

## 2024-05-16 RX ORDER — CEFAZOLIN SODIUM 2 G/100ML
2 INJECTION, SOLUTION INTRAVENOUS ONCE
Status: DISCONTINUED | OUTPATIENT
Start: 2024-05-16 | End: 2024-05-16 | Stop reason: HOSPADM

## 2024-05-16 RX ORDER — MUPIROCIN 20 MG/G
OINTMENT TOPICAL AS NEEDED
Status: DISCONTINUED | OUTPATIENT
Start: 2024-05-16 | End: 2024-05-16 | Stop reason: HOSPADM

## 2024-05-16 RX ORDER — SODIUM CHLORIDE 9 MG/ML
100 INJECTION, SOLUTION INTRAVENOUS CONTINUOUS
Status: DISCONTINUED | OUTPATIENT
Start: 2024-05-16 | End: 2024-05-16 | Stop reason: HOSPADM

## 2024-05-16 RX ORDER — ASPIRIN 81 MG
100 TABLET, DELAYED RELEASE (ENTERIC COATED) ORAL 2 TIMES DAILY
Qty: 20 TABLET | Refills: 0 | Status: SHIPPED | OUTPATIENT
Start: 2024-05-16 | End: 2024-05-26

## 2024-05-16 RX ORDER — ACETAMINOPHEN 325 MG/1
650 TABLET ORAL EVERY 4 HOURS PRN
Status: DISCONTINUED | OUTPATIENT
Start: 2024-05-16 | End: 2024-05-16 | Stop reason: HOSPADM

## 2024-05-16 RX ORDER — ACETAMINOPHEN 325 MG/1
650 TABLET ORAL EVERY 6 HOURS PRN
Start: 2024-05-16

## 2024-05-16 RX ORDER — ALBUTEROL SULFATE 0.83 MG/ML
2.5 SOLUTION RESPIRATORY (INHALATION) ONCE AS NEEDED
Status: DISCONTINUED | OUTPATIENT
Start: 2024-05-16 | End: 2024-05-16 | Stop reason: HOSPADM

## 2024-05-16 RX ORDER — LIDOCAINE HYDROCHLORIDE AND EPINEPHRINE 20; 10 MG/ML; UG/ML
INJECTION, SOLUTION INFILTRATION; PERINEURAL AS NEEDED
Status: DISCONTINUED | OUTPATIENT
Start: 2024-05-16 | End: 2024-05-16 | Stop reason: HOSPADM

## 2024-05-16 RX ORDER — FENTANYL CITRATE 50 UG/ML
50 INJECTION, SOLUTION INTRAMUSCULAR; INTRAVENOUS EVERY 5 MIN PRN
Status: DISCONTINUED | OUTPATIENT
Start: 2024-05-16 | End: 2024-05-16 | Stop reason: HOSPADM

## 2024-05-16 RX ORDER — CIPROFLOXACIN AND DEXAMETHASONE 3; 1 MG/ML; MG/ML
SUSPENSION/ DROPS AURICULAR (OTIC) AS NEEDED
Status: DISCONTINUED | OUTPATIENT
Start: 2024-05-16 | End: 2024-05-16 | Stop reason: HOSPADM

## 2024-05-16 RX ORDER — LABETALOL HYDROCHLORIDE 5 MG/ML
5 INJECTION, SOLUTION INTRAVENOUS ONCE AS NEEDED
Status: DISCONTINUED | OUTPATIENT
Start: 2024-05-16 | End: 2024-05-16 | Stop reason: HOSPADM

## 2024-05-16 RX ORDER — TRAMADOL HYDROCHLORIDE 50 MG/1
50 TABLET ORAL EVERY 4 HOURS PRN
Qty: 12 TABLET | Refills: 0 | Status: SHIPPED | OUTPATIENT
Start: 2024-05-16

## 2024-05-16 RX ORDER — CEPHALEXIN 500 MG/1
500 CAPSULE ORAL 3 TIMES DAILY
Qty: 21 CAPSULE | Refills: 0 | Status: SHIPPED | OUTPATIENT
Start: 2024-05-16 | End: 2024-05-23

## 2024-05-16 RX ORDER — SODIUM CHLORIDE, SODIUM LACTATE, POTASSIUM CHLORIDE, CALCIUM CHLORIDE 600; 310; 30; 20 MG/100ML; MG/100ML; MG/100ML; MG/100ML
INJECTION, SOLUTION INTRAVENOUS CONTINUOUS PRN
Status: DISCONTINUED | OUTPATIENT
Start: 2024-05-16 | End: 2024-05-16

## 2024-05-16 RX ORDER — ONDANSETRON HYDROCHLORIDE 2 MG/ML
INJECTION, SOLUTION INTRAVENOUS AS NEEDED
Status: DISCONTINUED | OUTPATIENT
Start: 2024-05-16 | End: 2024-05-16

## 2024-05-16 RX ORDER — CEFAZOLIN 1 G/1
INJECTION, POWDER, FOR SOLUTION INTRAVENOUS AS NEEDED
Status: DISCONTINUED | OUTPATIENT
Start: 2024-05-16 | End: 2024-05-16

## 2024-05-16 RX ORDER — MIDAZOLAM HYDROCHLORIDE 1 MG/ML
INJECTION, SOLUTION INTRAMUSCULAR; INTRAVENOUS AS NEEDED
Status: DISCONTINUED | OUTPATIENT
Start: 2024-05-16 | End: 2024-05-16

## 2024-05-16 RX ORDER — DEXAMETHASONE SODIUM PHOSPHATE 100 MG/10ML
INJECTION INTRAMUSCULAR; INTRAVENOUS AS NEEDED
Status: DISCONTINUED | OUTPATIENT
Start: 2024-05-16 | End: 2024-05-16

## 2024-05-16 RX ORDER — ONDANSETRON HYDROCHLORIDE 2 MG/ML
4 INJECTION, SOLUTION INTRAVENOUS ONCE AS NEEDED
Status: COMPLETED | OUTPATIENT
Start: 2024-05-16 | End: 2024-05-16

## 2024-05-16 RX ORDER — CIPROFLOXACIN AND DEXAMETHASONE 3; 1 MG/ML; MG/ML
5 SUSPENSION/ DROPS AURICULAR (OTIC) DAILY
Qty: 7.5 ML | Refills: 3 | Status: SHIPPED | OUTPATIENT
Start: 2024-05-16

## 2024-05-16 RX ORDER — SODIUM CHLORIDE, SODIUM LACTATE, POTASSIUM CHLORIDE, CALCIUM CHLORIDE 600; 310; 30; 20 MG/100ML; MG/100ML; MG/100ML; MG/100ML
100 INJECTION, SOLUTION INTRAVENOUS CONTINUOUS
Status: DISCONTINUED | OUTPATIENT
Start: 2024-05-16 | End: 2024-05-16 | Stop reason: HOSPADM

## 2024-05-16 RX ADMIN — LIDOCAINE HYDROCHLORIDE 60 MG: 20 INJECTION, SOLUTION INFILTRATION; PERINEURAL at 07:37

## 2024-05-16 RX ADMIN — EPHEDRINE SULFATE 10 MG: 50 INJECTION, SOLUTION INTRAVENOUS at 08:31

## 2024-05-16 RX ADMIN — SODIUM CHLORIDE, POTASSIUM CHLORIDE, SODIUM LACTATE AND CALCIUM CHLORIDE: 600; 310; 30; 20 INJECTION, SOLUTION INTRAVENOUS at 07:33

## 2024-05-16 RX ADMIN — MIDAZOLAM 2 MG: 1 INJECTION INTRAMUSCULAR; INTRAVENOUS at 07:37

## 2024-05-16 RX ADMIN — SUGAMMADEX 200 MG: 100 INJECTION, SOLUTION INTRAVENOUS at 11:02

## 2024-05-16 RX ADMIN — DEXAMETHASONE SODIUM PHOSPHATE 10 MG: 10 INJECTION INTRAMUSCULAR; INTRAVENOUS at 07:49

## 2024-05-16 RX ADMIN — REMIFENTANIL HYDROCHLORIDE 0.05 MCG/KG/MIN: 1 INJECTION, POWDER, LYOPHILIZED, FOR SOLUTION INTRAVENOUS at 07:46

## 2024-05-16 RX ADMIN — CEFAZOLIN 2 G: 1 INJECTION, POWDER, FOR SOLUTION INTRAMUSCULAR; INTRAVENOUS at 07:49

## 2024-05-16 RX ADMIN — EPHEDRINE SULFATE 5 MG: 50 INJECTION, SOLUTION INTRAVENOUS at 08:23

## 2024-05-16 RX ADMIN — ROCURONIUM BROMIDE 50 MG: 50 INJECTION INTRAVENOUS at 07:37

## 2024-05-16 RX ADMIN — PROPOFOL 160 MG: 10 INJECTION, EMULSION INTRAVENOUS at 07:37

## 2024-05-16 RX ADMIN — ONDANSETRON 4 MG: 2 INJECTION INTRAMUSCULAR; INTRAVENOUS at 11:45

## 2024-05-16 RX ADMIN — ONDANSETRON 4 MG: 2 INJECTION INTRAMUSCULAR; INTRAVENOUS at 10:47

## 2024-05-16 RX ADMIN — EPHEDRINE SULFATE 10 MG: 50 INJECTION, SOLUTION INTRAVENOUS at 08:54

## 2024-05-16 RX ADMIN — GLYCOPYRROLATE 0.2 MG: 0.2 INJECTION INTRAMUSCULAR; INTRAVENOUS at 08:18

## 2024-05-16 RX ADMIN — FENTANYL CITRATE 100 MCG: 50 INJECTION, SOLUTION INTRAMUSCULAR; INTRAVENOUS at 07:37

## 2024-05-16 ASSESSMENT — PAIN SCALES - GENERAL
PAINLEVEL_OUTOF10: 0 - NO PAIN

## 2024-05-16 ASSESSMENT — PAIN - FUNCTIONAL ASSESSMENT
PAIN_FUNCTIONAL_ASSESSMENT: 0-10

## 2024-05-16 ASSESSMENT — COLUMBIA-SUICIDE SEVERITY RATING SCALE - C-SSRS
1. IN THE PAST MONTH, HAVE YOU WISHED YOU WERE DEAD OR WISHED YOU COULD GO TO SLEEP AND NOT WAKE UP?: NO
2. HAVE YOU ACTUALLY HAD ANY THOUGHTS OF KILLING YOURSELF?: NO
6. HAVE YOU EVER DONE ANYTHING, STARTED TO DO ANYTHING, OR PREPARED TO DO ANYTHING TO END YOUR LIFE?: NO

## 2024-05-16 NOTE — ANESTHESIA PREPROCEDURE EVALUATION
Patient: Wendy Ayala    Procedure Information       Date/Time: 05/16/24 0730    Procedure: Postauricular Tympanoplasty, canalplasty with composite cartilage graft (Left) - OR: 2.5 hrs    [place] [facility] [place] [facility] [place] [facility] [place] [facility]    Surgeons: Richie Rasmussen MD            Relevant Problems   Anesthesia (within normal limits)      Cardiac   (+) HTN (hypertension)      Neuro (within normal limits)      /Renal (within normal limits)      Liver (within normal limits)      Endocrine   (+) Diabetes mellitus, type 2 (Multi)      HEENT   (+) Mixed conductive and sensorineural hearing loss of left ear with restricted hearing of right ear   (+) Sensorineural hearing loss (SNHL) of right ear with restricted hearing of left ear       Clinical information reviewed:   Tobacco  Allergies  Meds   Med Hx  Surg Hx   Fam Hx          NPO Detail:  NPO/Void Status  Date of Last Liquid: 05/15/24  Time of Last Liquid: 0500  Date of Last Solid: 05/15/24  Time of Last Solid: 1800         Physical Exam    Airway  Mallampati: III  TM distance: >3 FB  Neck ROM: full     Cardiovascular - normal exam     Dental    Pulmonary - normal exam  Breath sounds clear to auscultation     Abdominal        Anesthesia Plan    History of general anesthesia?: yes  History of complications of general anesthesia?: no    ASA 3     general     intravenous induction   Anesthetic plan and risks discussed with patient.  Use of blood products discussed with patient who.

## 2024-05-16 NOTE — OP NOTE
.      OPERATIVE NOTE     Date:  2024 [place] [facility] [place] [place]    Name: Wendy Ayala : 1955, Age: 68 y.o., MRN: [id number], Sex: female      Surgeons      Richie Rasmussen MD    Resident/Fellow/Other Assistant:  Vasquez Loo MD, Fela Martinez MD    Anesthesia: General  ASA: III  Anesthesia Staff: Anesthesiologist: Howard Guerra DO  CRNA: Melissa Karimi APRN-CRNA  SRNA: Meghan Blum  Staff: Circulator: Enrique Reyes RN; Lashay Renee RN  Relief Scrub: Opal Flores  Scrub Person: Penny Hatfield          Preoperative Diagnosis:  1.Tympanic membrane perforation.   - Left  2. Conductive hearing loss   - Unrestricted on the contralateral side   - Left      Postoperative Diagnosis:  1.Tympanic membrane perforation.   - Left  2. Conductive hearing loss   - Unrestricted on the contralateral side   - Left      Procedure Performed:  1.  Postauricular Tympanoplasty, canalplasty, and composite cartilage graft   - Left  2. Conchal Composite cartilage / perichondrium graft    -   Left  3. Needle electromyography; cranial nerve supplied muscle(s), unilateral   - Facial nerve.   - Left  4. Microsurgical techniques, requiring use of operating microscope   - Left      Indications:  Wendy Ayala is a very pleasant 68 y.o. female who presents with a history of a stable tympanic membrane perforation with conductive hearing loss. We plan to do a tympanoplasty, canalplasty, composite cartilage graft and possible ossicular chain reconstruction. The risks, indications, alternatives and complications of surgery were discussed including, but not limited to facial nerve injury, deafness in the operated ear, vertigo, dizziness, imbalance, facial weakness or paralysis, change in sense of taste, perforation of eardrum, pain, bleeding, infection, scarring, need for further surgery, recurrence, prosthesis extrusion, spinal fluid leak, meningitis, brain damage, brain abscess, stroke, and death. Informed  consent was obtained and the patient and family elected to proceed. Because of the extensive nature of the dissection and the close proximity of the facial nerve, facial nerve monitoring was used throughout the case.       Operative Findings:  1. 60% stable central tympanic membrane perforation.  2. Narrow and tortuous ear canal with broad bony protrusions suspicious for exostosis, and canalplasty performed.  3. Intact ossicles and mobile.  4. Chorda  unable to be visualized .  5. Middle ear mucosa not inflamed.      Operative Technique:   The patient was identified in the holding area and then brought to the operating room and placed supine on the operating table. After successful induction of general endotracheal anesthesia via endotracheal tube intubation, facial nerve bipolar electrodes were placed on the patient's orbicularis oris and oculi muscles and monitored the facial nerve throughout the course of the case. The patient was then prepped and draped in normal sterile fashion. A small amount of hair was shaved and the patient had nearly 4 cc of 2%lidocaine with 1:100.000 epinephrine injected into their postauricular sulcus and tragus. The ear was prepped and draped in the normal fashion. Standard 4 quadrant canal injections were then performed. Vascular strip incisions were then made.     The postauricular incision was then opened down to the level of the loose areolar tissue over the temporalis fascia. A large graft of this material was harvested and pressed for later use in reconstructing the ear drum. An incision was made in the posterior caridad and a large composite cartilage with perichondrium graft was harvested from the caridad cymba. The wound was irrigated and closed with interupted vycril sutures. The cartilage was then thinned and cut to the appropriate size. A T-shaped mastoid periosteal incision was made. The mastoid periosteum was then raised superiorly over the temporalis, posteriorly over the  sigmoid and anteriorly to the external auditory canal where the vascular strip was identified and reflected laterally. False temporalis fascia was then obtained and left to dry.    The wound was then copiously irrigated. The tympanic membrane perforation was then freshened using a rasp. At this time, the posterior tympanomeatal flap was raised to the level of the middle ear. A large canalplasty was perfomed with 2-0 lonnie humaira. The perforation was stable The ear drum was then removed off the handle of the malleus with a #1 knife and a sickle [place] blade leaving the lateral process attached. The ossicles were intact and mobile.     Attention was then turned towards reconstruction. The middle ear was packed with gelfoam soaked in ciprodex. Composite cartilage/ perichondrium grafts were then placed medial to the remnant drum, anterior, posterior and inferior to the handle of the malleus.  The previously cut fascia graft was then cut to the appropriate size and placed in the normal location of the eardrum. The posterior tympanomeatal flap was then returned to its normal location. It was secured with Gelfoam and Bactroban ointment. The vascular strip was then returned to its normal location. The periosteum was then closed using interrupted Vicryl sutures. The skin was then closed using interrupted Vicryl sutures. The ear canal was inspected again to ensure the vascular strip was in a proper location and the canal was filled with ointment. The wound was then covered with a cotton ball, Telfa, and a Andrés dressing.    This completed the procedure. The patient was then emerged from anesthesia and extubated without difficulty. The patient was then transported back to PACU. There were no apparent complications. Following the procedure, I discussed the findings with the patient's family and answered all of their questions.    Implants: Nothing was implanted during the procedure  Specimens: No specimens  collected  Estimated Blood Loss: Minimal  Complications: none  Condition of the patient: Stable  Disposition: PACU    ____________________________________________________  Richie Javier MD  Professor and Chief   Otology/Neurotology/Lateral Skull-Base Surgery   [facility]  Phone: [phone]  Fax: [phone]

## 2024-05-16 NOTE — ANESTHESIA POSTPROCEDURE EVALUATION
Patient: Wendy Ayala    Procedure Summary       Date: 05/16/24 [facility] / [facility]    Anesthesia Start: 0728 Anesthesia Stop: 1110    Procedure: Postauricular Tympanoplasty, canalplasty with composite cartilage graft (Left) Diagnosis:       Perforation of left tympanic membrane      Mixed conductive and sensorineural hearing loss of left ear with restricted hearing of right ear      (Perforation of left tympanic membrane [H72.92])      (Mixed conductive and sensorineural hearing loss of left ear with restricted hearing of right ear [H90.A32])    Surgeons: Richie Rasmussen MD Responsible Provider: Howard Guerra DO    Anesthesia Type: general ASA Status: 3            Anesthesia Type: general    Vitals Value Taken Time   /74 05/16/24 1123   Temp 36.4 °C (97.5 °F) 05/16/24 1108   Pulse 51 05/16/24 1123   Resp 16 05/16/24 1123   SpO2 99 % 05/16/24 1123       Anesthesia Post Evaluation    Patient location during evaluation: PACU  Patient participation: complete - patient participated  Level of consciousness: awake  Pain management: satisfactory to patient  Multimodal analgesia pain management approach  Airway patency: patent  Cardiovascular status: acceptable  Respiratory status: acceptable  Hydration status: acceptable  Postoperative Nausea and Vomiting: none    No notable events documented.

## 2024-05-16 NOTE — ANESTHESIA PROCEDURE NOTES
Airway  Date/Time: 5/16/2024 7:41 AM  Urgency: elective    Airway not difficult    Staffing  Performed: KRAIG   Authorized by: Howard Guerra DO    Performed by: Meghan Blum  Patient location during procedure: OR    Indications and Patient Condition  Indications for airway management: anesthesia and airway protection  Spontaneous Ventilation: absent  Sedation level: deep  Preoxygenated: yes  Patient position: sniffing  MILS maintained throughout  Mask difficulty assessment: 1 - vent by mask    Final Airway Details  Final airway type: endotracheal airway      Successful airway: ETT  Cuffed: yes   Successful intubation technique: direct laryngoscopy  Facilitating devices/methods: intubating stylet  Endotracheal tube insertion site: oral  Blade: Robinson  Blade size: #3  ETT size (mm): 7.0  Cormack-Lehane Classification: grade I - full view of glottis  Placement verified by: chest auscultation and capnometry   Measured from: lips  ETT to lips (cm): 22  Number of attempts at approach: 1    Additional Comments  Atraumatic

## 2024-05-17 ENCOUNTER — TELEPHONE (OUTPATIENT)
Dept: OTOLARYNGOLOGY | Facility: HOSPITAL | Age: 69
End: 2024-05-17
Payer: MEDICARE

## 2024-05-17 ASSESSMENT — PAIN SCALES - GENERAL: PAINLEVEL_OUTOF10: 0 - NO PAIN

## 2024-05-17 NOTE — TELEPHONE ENCOUNTER
RN called patient as follow up from surgery Dr. Javier on 5/16. Patient states pain is controlled with tylenol. Is experiencing some expected drainage from operative ear. FUV booked and confirmed with patient.

## 2024-05-19 LAB
ATRIAL RATE: 41 BPM
P AXIS: 61 DEGREES
P OFFSET: 178 MS
P ONSET: 120 MS
PR INTERVAL: 198 MS
Q ONSET: 219 MS
QRS COUNT: 7 BEATS
QRS DURATION: 94 MS
QT INTERVAL: 464 MS
QTC CALCULATION(BAZETT): 382 MS
QTC FREDERICIA: 409 MS
R AXIS: 12 DEGREES
T AXIS: 48 DEGREES
T OFFSET: 451 MS
VENTRICULAR RATE: 41 BPM

## 2024-06-14 ENCOUNTER — APPOINTMENT (OUTPATIENT)
Dept: OTOLARYNGOLOGY | Facility: CLINIC | Age: 69
End: 2024-06-14
Payer: MEDICARE

## 2024-06-14 DIAGNOSIS — H83.8X3 SUPERIOR SEMICIRCULAR CANAL DEHISCENCE OF BOTH EARS: ICD-10-CM

## 2024-06-14 DIAGNOSIS — H72.92 PERFORATION OF LEFT TYMPANIC MEMBRANE: Primary | ICD-10-CM

## 2024-06-14 DIAGNOSIS — H90.A21 SENSORINEURAL HEARING LOSS (SNHL) OF RIGHT EAR WITH RESTRICTED HEARING OF LEFT EAR: ICD-10-CM

## 2024-06-14 DIAGNOSIS — H90.A32 MIXED CONDUCTIVE AND SENSORINEURAL HEARING LOSS OF LEFT EAR WITH RESTRICTED HEARING OF RIGHT EAR: ICD-10-CM

## 2024-06-14 PROCEDURE — 1126F AMNT PAIN NOTED NONE PRSNT: CPT | Performed by: OTOLARYNGOLOGY

## 2024-06-14 PROCEDURE — 99024 POSTOP FOLLOW-UP VISIT: CPT | Performed by: OTOLARYNGOLOGY

## 2024-06-14 PROCEDURE — 1160F RVW MEDS BY RX/DR IN RCRD: CPT | Performed by: OTOLARYNGOLOGY

## 2024-06-14 PROCEDURE — 1159F MED LIST DOCD IN RCRD: CPT | Performed by: OTOLARYNGOLOGY

## 2024-06-14 ASSESSMENT — COLUMBIA-SUICIDE SEVERITY RATING SCALE - C-SSRS: 1. IN THE PAST MONTH, HAVE YOU WISHED YOU WERE DEAD OR WISHED YOU COULD GO TO SLEEP AND NOT WAKE UP?: NO

## 2024-06-14 ASSESSMENT — PATIENT HEALTH QUESTIONNAIRE - PHQ9
2. FEELING DOWN, DEPRESSED OR HOPELESS: NOT AT ALL
SUM OF ALL RESPONSES TO PHQ9 QUESTIONS 1 AND 2: 0
1. LITTLE INTEREST OR PLEASURE IN DOING THINGS: NOT AT ALL

## 2024-06-14 ASSESSMENT — PAIN SCALES - GENERAL: PAINLEVEL: 0-NO PAIN

## 2024-06-14 NOTE — PROGRESS NOTES
Reason for Consult:  Follow-up and Post-op Visit     Subjective   History Of Present Illness:  Wendy Ayala is a 68 y.o. female who presents for a post operative appointment. She has a hx of encephalocele and CSF leak repair by Dr. Greenwood on right. The recent CT showed bilateral superior canal dehiscences.  She also had 44% discrimination on the left compared to the right which is 84%.    I performed surgery on 5/16/2024 and my findings were a 60% stable central TM perforation on the left, a narrow and tortuous ear canal with broad bony protrusions suspicious for exostosis, intact ossicles, middle ear mucosa, and unable to visualize the chorda.        Past Medical History:  She has a past medical history of Acid reflux, HTN (hypertension), and Type 2 diabetes mellitus (Multi).    Surgical History:  She has a past surgical history that includes Colonoscopy; Other surgical history; Other surgical history; Other surgical history; Other surgical history; and Other surgical history.     Social History:  She reports that she has never smoked. She has been exposed to tobacco smoke. She has never used smokeless tobacco. She reports that she does not currently use alcohol. She reports that she does not use drugs.    Family History:  family history is not on file.     Medications:  Current Outpatient Medications   Medication Instructions    acetaminophen (TYLENOL) 650 mg, oral, Every 6 hours PRN    albuterol 90 mcg/actuation inhaler INHALE 2 PUFFS BY MOUTH EVERY 4 HOURS as instructed AS NEEDED FOR WHEEZING, SHORTNESS OF BREATH    ciprofloxacin-dexamethasone (Ciprodex) otic suspension 5 drops, Left Ear, Daily, Start drops to the left ear 1 week before your follow-up appointment with Dr. Javier    fluticasone (Flonase) 50 mcg/actuation nasal spray 16    hydroCHLOROthiazide (HYDRODiuril) 25 mg tablet 90    ibuprofen 600 mg, oral, Every 6 hours PRN    metFORMIN XR (GLUCOPHAGE-XR) 500 mg, oral, Daily RT    metoprolol  succinate XL (Toprol-XL) 100 mg 24 hr tablet 90    omeprazole (PRILOSEC) 40 mg, oral, Daily    ondansetron ODT (ZOFRAN-ODT) 4 mg, oral, Every 8 hours PRN    oxybutynin XL (DITROPAN-XL) 10 mg, oral, Daily    rosuvastatin (CRESTOR) 10 mg, oral, Daily RT    traMADol (ULTRAM) 50 mg, oral, Every 4 hours PRN      Allergies:  Patient has no known allergies.    Review of Systems:   A comprehensive 10-point review of systems was obtained including constitutional, neurological, HEENT, pulmonary, cardiovascular, genito-urinary, and other pertinent systems and was negative except as noted in the HPI.     Objective   Physical Exam:  Last Recorded Vitals: There were no vitals taken for this visit.    On physical exam, the patient is a well-nourished, well-developed patient, in no acute distress, able to communicate without assistance in English language. Head and face is atraumatic and normocephalic. Salivary glands are intact. Facial strength is symmetrical bilaterally.       On ear examination:  Right ear: The patient has an open and patent ear canal. The tympanic membrane is intact.  AC>BC  Left ear: The patient has packing which was removed. The tympanic membrane  is healing well and intact .  BC>AC  The Nelson is left    On vestibular exam, the patient has no spontaneous nystagmus, no headshake nystagmus, no head-thrust nystagmus, and no nystagmus on hyperventilation or Valsalva maneuvers. Armstrong-Hallpike maneuver is negative bilaterally.       On neuro exam, the patient is alert and oriented x3, cranial nerves are grossly intact, cerebellar exam is normal.      The rest of the exam, including anterior rhinoscopy, oropharyngeal exam, neck exam, and cardiovascular exam, were normal including no palpable lymphadenopathies, thyroid in the midline position, normal pulses, and normal chest excursion.         Reviewed Results:  Audiology Testing:   I personally reviewed the audiogram from 04/2024 which showed a moderate severe sloping  to severe mixed hearing loss bilateral with  15 dB of air-bone gap in the low frequencies. She has 44% discrimination on the left and 84% on the right.       I personally reviewed the audiogram from 01/2024 from Dr. Zuleta which showed a moderate severe sloping to severe mixed hearing loss in the left ear. And 20-25 dB of air-bone gap. The right ear was not tested.          Assessment/Plan   In summary, Wendy Ayala is a 68 y.o. 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  She has a hx of encephalocele and CSF leak repair by Timo Santana on right.    She is now status post left sided tympanoplasty with composite cartilage graft on 5/16/24. She is healing well, the packing was removed and the neotympanum looks intact    - Continue drops for 2-3 more weeks.  - Return to regular activities.  - Continue dry ear precautions.  - Follow up in 6 weeks.      Scribe Attestation  By signing my name below, I, Teodoro Hanson   attest that this documentation has been prepared under the direction and in the presence of Richie Rasmussen MD.  __________________________________________________  Richie Javier MD  Professor and Chief   Otology/Neurotology/Lateral Skull-Base Surgery   [facility]

## 2024-06-14 NOTE — PROGRESS NOTES
"Subjective   Patient ID: Wendy Ayala is a 68 y.o. female who presents for Follow-up and Post-op Visit.    HPI     A ten point review of systems was performed and was negative except for what is mentioned in the HPI      Objective   There were no vitals taken for this visit.    Physical Exam:  Last Recorded Vitals: There were no vitals taken for this visit.    On physical exam, the patient is a well-nourished, well-developed patient, in no acute distress, able to communicate without assistance in English language. Head and face is atraumatic and normocephalic. Salivary glands are intact. Facial strength is symmetrical bilaterally.       On ear examination:  Right ear: The patient has {Blank single:07891::\"stenotic ear canal\",\"cerumen impaction which was removed\",\"packing which was removed\",\"an open and patent ear canal\"}. The {Blank single:62235::\"neotympanum\",\"tympanic membrane\"} {Blank single:19197::\"has a perforation located in ***\",\"has an effusion\",\"is intact\"}.  {Blank single:19197::\"Cannot discern sound\",\"BC>AC\",\"AC>BC\"}  Left ear: The patient has {Blank single:17773::\"stenotic ear canal\",\"cerumen impaction which was removed\",\"packing which was removed\",\"an open and patent ear canal\"}. The {Blank single:19197::\"neotympanum\",\"tympanic membrane\"} {Blank single:19197::\"has a perforation located in ***\",\"has an effusion\",\"is intact\"}.  {Blank single:19197::\"Cannot discern sound\",\"BC>AC\",\"AC>BC\"}  The Nelson is {Blank single:84274::\"right\",\"left\",\"midline\"}    On vestibular exam, the patient has no spontaneous nystagmus, no headshake nystagmus, no head-thrust nystagmus, and no nystagmus on hyperventilation or Valsalva maneuvers. [place]-Hallpike maneuver is negative bilaterally.       On neuro exam, the patient is alert and oriented x3, cranial nerves are grossly intact, cerebellar exam is normal.      The rest of the exam, including anterior rhinoscopy, oropharyngeal exam, neck exam, and cardiovascular exam, were " normal including no palpable lymphadenopathies, thyroid in the midline position, normal pulses, and normal chest excursion.      Assessment/Plan   {Assess/PlanSmartLinks:81159}    Scribe Attestation  By signing my name below, I, Tedooro Hanson   attest that this documentation has been prepared under the direction and in the presence of Richie Rasmussen MD.

## 2024-06-14 NOTE — LETTER
June 18, 2024     Jose Zuleta MD  [address]  [address]  [place] [facility] [place] [facility] [address]    Patient: Wendy Ayala   YOB: 1955   Date of Visit: 6/14/2024       Dear Dr. Jose Zuleta MD:    Thank you for referring Wendy Ayala to me for evaluation. Below are my notes for this consultation.  If you have questions, please do not hesitate to call me. I look forward to following your patient along with you.       Sincerely,     Richie Rasmussen MD      CC: Fide Roca MD  ______________________________________________________________________________________            Reason for Consult:  Follow-up and Post-op Visit     Subjective  History Of Present Illness:  Wendy Ayala is a 68 y.o. female who presents for a post operative appointment. She has a hx of encephalocele and CSF leak repair by Dr. Greenwood on right. The recent CT showed bilateral superior canal dehiscences.  She also had 44% discrimination on the left compared to the right which is 84%.    I performed surgery on 5/16/2024 and my findings were a 60% stable central TM perforation on the left, a narrow and tortuous ear canal with broad bony protrusions suspicious for exostosis, intact ossicles, middle ear mucosa, and unable to visualize the chorda.        Past Medical History:  She has a past medical history of Acid reflux, HTN (hypertension), and Type 2 diabetes mellitus (Multi).    Surgical History:  She has a past surgical history that includes Colonoscopy; Other surgical history; Other surgical history; Other surgical history; Other surgical history; and Other surgical history.     Social History:  She reports that she has never smoked. She has been exposed to tobacco smoke. She has never used smokeless tobacco. She reports that she does not currently use alcohol. She reports that she does not use drugs.    Family History:  family history is not on file.     Medications:  Current Outpatient Medications    Medication Instructions   • acetaminophen (TYLENOL) 650 mg, oral, Every 6 hours PRN   • albuterol 90 mcg/actuation inhaler INHALE 2 PUFFS BY MOUTH EVERY 4 HOURS as instructed AS NEEDED FOR WHEEZING, SHORTNESS OF BREATH   • ciprofloxacin-dexamethasone (Ciprodex) otic suspension 5 drops, Left Ear, Daily, Start drops to the left ear 1 week before your follow-up appointment with Dr. Javier   • fluticasone (Flonase) 50 mcg/actuation nasal spray 16   • hydroCHLOROthiazide (HYDRODiuril) 25 mg tablet 90   • ibuprofen 600 mg, oral, Every 6 hours PRN   • metFORMIN XR (GLUCOPHAGE-XR) 500 mg, oral, Daily RT   • metoprolol succinate XL (Toprol-XL) 100 mg 24 hr tablet 90   • omeprazole (PRILOSEC) 40 mg, oral, Daily   • ondansetron ODT (ZOFRAN-ODT) 4 mg, oral, Every 8 hours PRN   • oxybutynin XL (DITROPAN-XL) 10 mg, oral, Daily   • rosuvastatin (CRESTOR) 10 mg, oral, Daily RT   • traMADol (ULTRAM) 50 mg, oral, Every 4 hours PRN      Allergies:  Patient has no known allergies.    Review of Systems:   A comprehensive 10-point review of systems was obtained including constitutional, neurological, HEENT, pulmonary, cardiovascular, genito-urinary, and other pertinent systems and was negative except as noted in the HPI.     Objective  Physical Exam:  Last Recorded Vitals: There were no vitals taken for this visit.    On physical exam, the patient is a well-nourished, well-developed patient, in no acute distress, able to communicate without assistance in English language. Head and face is atraumatic and normocephalic. Salivary glands are intact. Facial strength is symmetrical bilaterally.       On ear examination:  Right ear: The patient has an open and patent ear canal. The tympanic membrane is intact.  AC>BC  Left ear: The patient has packing which was removed. The tympanic membrane  is healing well and intact .  BC>AC  The Nelson is left    On vestibular exam, the patient has no spontaneous nystagmus, no headshake nystagmus, no  head-thrust nystagmus, and no nystagmus on hyperventilation or Valsalva maneuvers. Kelsey-Hallpike maneuver is negative bilaterally.       On neuro exam, the patient is alert and oriented x3, cranial nerves are grossly intact, cerebellar exam is normal.      The rest of the exam, including anterior rhinoscopy, oropharyngeal exam, neck exam, and cardiovascular exam, were normal including no palpable lymphadenopathies, thyroid in the midline position, normal pulses, and normal chest excursion.         Reviewed Results:  Audiology Testing:   I personally reviewed the audiogram from 04/2024 which showed a moderate severe sloping to severe mixed hearing loss bilateral with  15 dB of air-bone gap in the low frequencies. She has 44% discrimination on the left and 84% on the right.       I personally reviewed the audiogram from 01/2024 from Dr. Zuleta which showed a moderate severe sloping to severe mixed hearing loss in the left ear. And 20-25 dB of air-bone gap. The right ear was not tested.          Assessment/Plan  In summary, Wendy Ayala is a 68 y.o. 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  She has a hx of encephalocele and CSF leak repair by Timo Santana on right.    She is now status post left sided tympanoplasty with composite cartilage graft on 5/16/24. She is healing well, the packing was removed and the neotympanum looks intact    - Continue drops for 2-3 more weeks.  - Return to regular activities.  - Continue dry ear precautions.  - Follow up in 6 weeks.      Scribe Attestation  By signing my name below, I, Teodoro Hanson   attest that this documentation has been prepared under the direction and in the presence of Richie Rasmussen MD.  __________________________________________________  Richie Javier MD  Professor and Chief   Otology/Neurotology/Lateral Skull-Base Surgery   [facility]

## 2024-07-01 DIAGNOSIS — H66.3X2 CHRONIC SUPPURATIVE OTITIS MEDIA OF LEFT EAR, UNSPECIFIED OTITIS MEDIA LOCATION: ICD-10-CM

## 2024-07-01 RX ORDER — AMOXICILLIN AND CLAVULANATE POTASSIUM 875; 125 MG/1; MG/1
1 TABLET, FILM COATED ORAL 2 TIMES DAILY
Qty: 28 TABLET | Refills: 0 | Status: SHIPPED | OUTPATIENT
Start: 2024-07-01 | End: 2024-07-15

## 2024-07-01 NOTE — PROGRESS NOTES
"Patient called complaining of L ear \"clogging\" and L eye watering, swelling, itching, and crustiness in the morning. No nasal drainage noted. No ear drainage noted. Watery drainage from L eye during daytime. Patient states recently exposed to grandchildren with viral illness. Will alert Dr. Javier for recommendations.   "

## 2024-08-09 ENCOUNTER — APPOINTMENT (OUTPATIENT)
Dept: OTOLARYNGOLOGY | Facility: CLINIC | Age: 69
End: 2024-08-09
Payer: MEDICARE

## 2024-08-09 ENCOUNTER — CLINICAL SUPPORT (OUTPATIENT)
Dept: AUDIOLOGY | Facility: CLINIC | Age: 69
End: 2024-08-09
Payer: MEDICARE

## 2024-08-09 VITALS — WEIGHT: 171 LBS | BODY MASS INDEX: 25.91 KG/M2 | HEIGHT: 68 IN

## 2024-08-09 DIAGNOSIS — H90.A32 MIXED CONDUCTIVE AND SENSORINEURAL HEARING LOSS OF LEFT EAR WITH RESTRICTED HEARING OF RIGHT EAR: Primary | ICD-10-CM

## 2024-08-09 DIAGNOSIS — H90.A32 MIXED CONDUCTIVE AND SENSORINEURAL HEARING LOSS OF LEFT EAR WITH RESTRICTED HEARING OF RIGHT EAR: ICD-10-CM

## 2024-08-09 DIAGNOSIS — H90.3 SENSORINEURAL HEARING LOSS, BILATERAL: ICD-10-CM

## 2024-08-09 DIAGNOSIS — H90.A21 SENSORINEURAL HEARING LOSS (SNHL) OF RIGHT EAR WITH RESTRICTED HEARING OF LEFT EAR: ICD-10-CM

## 2024-08-09 DIAGNOSIS — H66.3X2 CHRONIC SUPPURATIVE OTITIS MEDIA OF LEFT EAR, UNSPECIFIED OTITIS MEDIA LOCATION: ICD-10-CM

## 2024-08-09 DIAGNOSIS — H72.92 PERFORATION OF LEFT TYMPANIC MEMBRANE: Primary | ICD-10-CM

## 2024-08-09 PROCEDURE — 99024 POSTOP FOLLOW-UP VISIT: CPT | Performed by: OTOLARYNGOLOGY

## 2024-08-09 PROCEDURE — 92557 COMPREHENSIVE HEARING TEST: CPT | Performed by: AUDIOLOGIST

## 2024-08-09 PROCEDURE — 1159F MED LIST DOCD IN RCRD: CPT | Performed by: OTOLARYNGOLOGY

## 2024-08-09 PROCEDURE — 1160F RVW MEDS BY RX/DR IN RCRD: CPT | Performed by: OTOLARYNGOLOGY

## 2024-08-09 PROCEDURE — 92550 TYMPANOMETRY & REFLEX THRESH: CPT | Performed by: AUDIOLOGIST

## 2024-08-09 PROCEDURE — 3008F BODY MASS INDEX DOCD: CPT | Performed by: OTOLARYNGOLOGY

## 2024-08-09 NOTE — PROGRESS NOTES
[facility]  AUDIOMETRIC EVALUATION      Name:  Wendy Ayala   :  1955  Age:  69 y.o.  Date of Evaluation:  24    HISTORY    Wendy Ayala is seen today at the request of Richie Rasmussen M.D.  The patient is an established patient monitoring hearing loss progression.    She has a history of a left tympanoplasty.    EVALUATION  See scanned audiogram in Media and included at the end of this report.    RESULTS    Otoscopic Evaluation:  Right Ear:  clear   Left Ear:  clear    Tympanometry:   Right Ear:  Type A, consistent with normal eardrum mobility and middle ear pressure   Left Ear:  Type B, suggestive of middle ear fluid    Ipsilateral acoustic reflexes were absent for the right ear at 2k and 4k Hz and absent for the left ear at all frequencies tested.    Pure Tone Audiometry:    Right Ear:  mild to severe sensorineural hearing loss  Left Ear:  mild to profound mixed hearing loss       Speech Audiometry:    Right Ear:  good in quiet at an elevated presentation level  Left Ear:  fair in quiet at an elevated presentation level  Speech reception thresholds were in good agreement with pure tone testing.    DISCUSSION  Results were relayed to Richie Rasmussen M.D.    APPOINTMENT TIME  2:30pm-3:00pm     Rell Lopez  Doctor of Audiology  Senior Audiologist

## 2024-08-09 NOTE — PROGRESS NOTES
Reason for Consult:  Follow-up and Post-op Visit     Subjective   History Of Present Illness:  Wendy Ayala is a 69 y.o. female who presents for a post operative appointment. She has a hx of encephalocele and CSF leak repair by Dr. Greenwood on right. The recent CT showed bilateral superior canal dehiscences.  She also had 44% discrimination on the left compared to the right which is 84%.    I performed surgery on 5/16/2024 and my findings were a 60% stable central TM perforation on the left, a narrow and tortuous ear canal with broad bony protrusions suspicious for exostosis, intact ossicles, middle ear mucosa, and unable to visualize the chorda.     Since the last visit, she has not been wearing her hearing aid. She is not able to hear well. She doesn't have drainage.        Past Medical History:  She has a past medical history of Acid reflux, HTN (hypertension), and Type 2 diabetes mellitus (Multi).    Surgical History:  She has a past surgical history that includes Colonoscopy; Other surgical history; Other surgical history; Other surgical history; Other surgical history; and Other surgical history.     Social History:  She reports that she has never smoked. She has been exposed to tobacco smoke. She has never used smokeless tobacco. She reports that she does not currently use alcohol. She reports that she does not use drugs.    Family History:  family history is not on file.     Medications:  Current Outpatient Medications   Medication Instructions    acetaminophen (TYLENOL) 650 mg, oral, Every 6 hours PRN    albuterol 90 mcg/actuation inhaler INHALE 2 PUFFS BY MOUTH EVERY 4 HOURS as instructed AS NEEDED FOR WHEEZING, SHORTNESS OF BREATH    ciprofloxacin-dexamethasone (Ciprodex) otic suspension 5 drops, Left Ear, Daily, Start drops to the left ear 1 week before your follow-up appointment with Dr. Javier    fluticasone (Flonase) 50 mcg/actuation nasal spray 16    hydroCHLOROthiazide (HYDRODiuril) 25 mg  "tablet 90    ibuprofen 600 mg, oral, Every 6 hours PRN    metFORMIN XR (GLUCOPHAGE-XR) 500 mg, oral, Daily RT    metoprolol succinate XL (Toprol-XL) 100 mg 24 hr tablet 90    omeprazole (PRILOSEC) 40 mg, oral, Daily    ondansetron ODT (ZOFRAN-ODT) 4 mg, oral, Every 8 hours PRN    oxybutynin XL (DITROPAN-XL) 10 mg, oral, Daily    rosuvastatin (CRESTOR) 10 mg, oral, Daily RT    traMADol (ULTRAM) 50 mg, oral, Every 4 hours PRN      Allergies:  Patient has no known allergies.    Review of Systems:   A comprehensive 10-point review of systems was obtained including constitutional, neurological, HEENT, pulmonary, cardiovascular, genito-urinary, and other pertinent systems and was negative except as noted in the HPI.     Objective   Physical Exam:  Last Recorded Vitals: Height 1.727 m (5' 8\"), weight 77.6 kg (171 lb).    On physical exam, the patient is a well-nourished, well-developed patient, in no acute distress, able to communicate without assistance in English language. Head and face is atraumatic and normocephalic. Salivary glands are intact. Facial strength is symmetrical bilaterally.       On ear examination:  Right ear: The patient has an open and patent ear canal. The tympanic membrane is intact.  AC>BC  Left ear: The patient has an open and patent ear canal. The neotympanum  is healing well and gentian violet was applied on an anea that has not epithelialized.  .  BC>AC  The Nelson is left    On vestibular exam, the patient has no spontaneous nystagmus, no headshake nystagmus, no head-thrust nystagmus, and no nystagmus on hyperventilation or Valsalva maneuvers. [place]-Hallpike maneuver is negative bilaterally.       On neuro exam, the patient is alert and oriented x3, cranial nerves are grossly intact, cerebellar exam is normal.      The rest of the exam, including anterior rhinoscopy, oropharyngeal exam, neck exam, and cardiovascular exam, were normal including no palpable lymphadenopathies, thyroid in the midline " position, normal pulses, and normal chest excursion.         Reviewed Results:  Audiology Testing:   I personally reviewed the audiogram from 04/2024 which showed a moderate severe sloping to severe mixed hearing loss bilateral with  15 dB of air-bone gap in the low frequencies. She has 44% discrimination on the left and 84% on the right.       I personally reviewed the audiogram from 01/2024 from Dr. Zuleta which showed a moderate severe sloping to severe mixed hearing loss in the left ear. And 20-25 dB of air-bone gap. The right ear was not tested.          Assessment/Plan   In summary, Wendy Ayala is a 68 y.o. 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  She has a hx of encephalocele and CSF leak repair by Timo Santana on right.    She is now status post left sided tympanoplasty with composite cartilage graft on 5/16/24. She is healing well, the packing was removed and the neotympanum looks intact    There's an area of the  EAC which has not epithelialized and gentian violet was applied. She has fluid behind the TM. For that reason she was started on Flonase.    I will see her back in 3 months with a repeat audiogram. She will continue with dry ear precautions.     Scribe Attestation  By signing my name below, I, Amrita Burgess , Scribe attest that this documentation has been prepared under the direction and in the presence of Richie Rasmussen MD.    __________________________________________________  Richie Javier MD  Professor and Chief   Otology/Neurotology/Lateral Skull-Base Surgery   [facility]

## 2024-08-09 NOTE — LETTER
August 10, 2024     Jose Zuleta MD  [address]  [address]  [facility] [address]    Patient: Wendy Ayala   YOB: 1955   Date of Visit: 8/9/2024       Dear Dr. Jose Zuleta MD:    Thank you for referring Wendy Ayala to me for evaluation. Below are my notes for this consultation.  If you have questions, please do not hesitate to call me. I look forward to following your patient along with you.       Sincerely,     Richie Rasmussen MD      CC: Fide Roca MD  ______________________________________________________________________________________            Reason for Consult:  Follow-up and Post-op Visit     Subjective  History Of Present Illness:  Wendy Ayala is a 69 y.o. female who presents for a post operative appointment. She has a hx of encephalocele and CSF leak repair by Dr. Greenwood on right. The recent CT showed bilateral superior canal dehiscences.  She also had 44% discrimination on the left compared to the right which is 84%.    I performed surgery on 5/16/2024 and my findings were a 60% stable central TM perforation on the left, a narrow and tortuous ear canal with broad bony protrusions suspicious for exostosis, intact ossicles, middle ear mucosa, and unable to visualize the chorda.     Since the last visit, she has not been wearing her hearing aid. She is not able to hear well. She doesn't have drainage.        Past Medical History:  She has a past medical history of Acid reflux, HTN (hypertension), and Type 2 diabetes mellitus (Multi).    Surgical History:  She has a past surgical history that includes Colonoscopy; Other surgical history; Other surgical history; Other surgical history; Other surgical history; and Other surgical history.     Social History:  She reports that she has never smoked. She has been exposed to tobacco smoke. She has never used smokeless tobacco. She reports that she does not currently use alcohol. She reports that she does  "not use drugs.    Family History:  family history is not on file.     Medications:  Current Outpatient Medications   Medication Instructions   • acetaminophen (TYLENOL) 650 mg, oral, Every 6 hours PRN   • albuterol 90 mcg/actuation inhaler INHALE 2 PUFFS BY MOUTH EVERY 4 HOURS as instructed AS NEEDED FOR WHEEZING, SHORTNESS OF BREATH   • ciprofloxacin-dexamethasone (Ciprodex) otic suspension 5 drops, Left Ear, Daily, Start drops to the left ear 1 week before your follow-up appointment with Dr. Javier   • fluticasone (Flonase) 50 mcg/actuation nasal spray 16   • hydroCHLOROthiazide (HYDRODiuril) 25 mg tablet 90   • ibuprofen 600 mg, oral, Every 6 hours PRN   • metFORMIN XR (GLUCOPHAGE-XR) 500 mg, oral, Daily RT   • metoprolol succinate XL (Toprol-XL) 100 mg 24 hr tablet 90   • omeprazole (PRILOSEC) 40 mg, oral, Daily   • ondansetron ODT (ZOFRAN-ODT) 4 mg, oral, Every 8 hours PRN   • oxybutynin XL (DITROPAN-XL) 10 mg, oral, Daily   • rosuvastatin (CRESTOR) 10 mg, oral, Daily RT   • traMADol (ULTRAM) 50 mg, oral, Every 4 hours PRN      Allergies:  Patient has no known allergies.    Review of Systems:   A comprehensive 10-point review of systems was obtained including constitutional, neurological, HEENT, pulmonary, cardiovascular, genito-urinary, and other pertinent systems and was negative except as noted in the HPI.     Objective  Physical Exam:  Last Recorded Vitals: Height 1.727 m (5' 8\"), weight 77.6 kg (171 lb).    On physical exam, the patient is a well-nourished, well-developed patient, in no acute distress, able to communicate without assistance in English language. Head and face is atraumatic and normocephalic. Salivary glands are intact. Facial strength is symmetrical bilaterally.       On ear examination:  Right ear: The patient has an open and patent ear canal. The tympanic membrane is intact.  AC>BC  Left ear: The patient has an open and patent ear canal. The neotympanum  is healing well and gentian violet " was applied on an anea that has not epithelialized.  .  BC>AC  The Nelson is left    On vestibular exam, the patient has no spontaneous nystagmus, no headshake nystagmus, no head-thrust nystagmus, and no nystagmus on hyperventilation or Valsalva maneuvers. Otto-Hallpike maneuver is negative bilaterally.       On neuro exam, the patient is alert and oriented x3, cranial nerves are grossly intact, cerebellar exam is normal.      The rest of the exam, including anterior rhinoscopy, oropharyngeal exam, neck exam, and cardiovascular exam, were normal including no palpable lymphadenopathies, thyroid in the midline position, normal pulses, and normal chest excursion.         Reviewed Results:  Audiology Testing:   I personally reviewed the audiogram from 04/2024 which showed a moderate severe sloping to severe mixed hearing loss bilateral with  15 dB of air-bone gap in the low frequencies. She has 44% discrimination on the left and 84% on the right.       I personally reviewed the audiogram from 01/2024 from Dr. Zuleta which showed a moderate severe sloping to severe mixed hearing loss in the left ear. And 20-25 dB of air-bone gap. The right ear was not tested.          Assessment/Plan  In summary, Wendy Ayala is a 68 y.o. 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  She has a hx of encephalocele and CSF leak repair by Timo Santana on right.    She is now status post left sided tympanoplasty with composite cartilage graft on 5/16/24. She is healing well, the packing was removed and the neotympanum looks intact    There's an area of the  EAC which has not epithelialized and gentian violet was applied. She has fluid behind the TM. For that reason she was started on Flonase.    I will see her back in 3 months with a repeat audiogram. She will continue with dry ear precautions.     Scribe Attestation  By signing my name below, IAmrita , Scribe attest that  this documentation has been prepared under the direction and in the presence of Richie Rasmussen MD.    __________________________________________________  Richie Javier MD  Professor and Chief   Otology/Neurotology/Lateral Skull-Base Surgery   [facility]

## 2024-08-23 ENCOUNTER — TELEPHONE (OUTPATIENT)
Dept: OTOLARYNGOLOGY | Facility: HOSPITAL | Age: 69
End: 2024-08-23
Payer: MEDICARE

## 2024-08-23 NOTE — TELEPHONE ENCOUNTER
RN returned call to patient as follow up. Patient c/o of fullness in operative ear. RN explained that MD ordered patient to use flonase at last visit, which patient has not started. Fluid was seen behind membrane and RN encouraged patient to use the flonase 2 sprays every day to help with these symptoms. Patient stated she will start flonase today and keep our office updated.

## 2024-10-15 ENCOUNTER — OFFICE VISIT (OUTPATIENT)
Dept: OTOLARYNGOLOGY | Facility: CLINIC | Age: 69
End: 2024-10-15
Payer: MEDICARE

## 2024-10-15 ENCOUNTER — CLINICAL SUPPORT (OUTPATIENT)
Dept: AUDIOLOGY | Facility: CLINIC | Age: 69
End: 2024-10-15
Payer: MEDICARE

## 2024-10-15 ENCOUNTER — APPOINTMENT (OUTPATIENT)
Dept: OTOLARYNGOLOGY | Facility: CLINIC | Age: 69
End: 2024-10-15
Payer: MEDICARE

## 2024-10-15 VITALS — HEIGHT: 68 IN | BODY MASS INDEX: 24.86 KG/M2 | WEIGHT: 164 LBS

## 2024-10-15 DIAGNOSIS — H90.A21 SENSORINEURAL HEARING LOSS (SNHL) OF RIGHT EAR WITH RESTRICTED HEARING OF LEFT EAR: Primary | ICD-10-CM

## 2024-10-15 DIAGNOSIS — H90.A32 MIXED CONDUCTIVE AND SENSORINEURAL HEARING LOSS OF LEFT EAR WITH RESTRICTED HEARING OF RIGHT EAR: ICD-10-CM

## 2024-10-15 DIAGNOSIS — H90.A21 SENSORINEURAL HEARING LOSS (SNHL) OF RIGHT EAR WITH RESTRICTED HEARING OF LEFT EAR: ICD-10-CM

## 2024-10-15 DIAGNOSIS — H61.22 IMPACTED CERUMEN OF LEFT EAR: ICD-10-CM

## 2024-10-15 DIAGNOSIS — H72.92 PERFORATION OF LEFT TYMPANIC MEMBRANE: Primary | ICD-10-CM

## 2024-10-15 PROCEDURE — 69210 REMOVE IMPACTED EAR WAX UNI: CPT | Performed by: OTOLARYNGOLOGY

## 2024-10-15 PROCEDURE — 92557 COMPREHENSIVE HEARING TEST: CPT | Performed by: AUDIOLOGIST

## 2024-10-15 PROCEDURE — 3008F BODY MASS INDEX DOCD: CPT | Performed by: OTOLARYNGOLOGY

## 2024-10-15 PROCEDURE — 1159F MED LIST DOCD IN RCRD: CPT | Performed by: OTOLARYNGOLOGY

## 2024-10-15 PROCEDURE — 1160F RVW MEDS BY RX/DR IN RCRD: CPT | Performed by: OTOLARYNGOLOGY

## 2024-10-15 PROCEDURE — 99213 OFFICE O/P EST LOW 20 MIN: CPT | Performed by: OTOLARYNGOLOGY

## 2024-10-15 NOTE — LETTER
October 22, 2024     Jose Zuleta MD  [address]  [facility] [address]    Patient: Wendy Ayala   YOB: 1955   Date of Visit: 10/15/2024       Dear Dr. Jose Zuleta MD:    Thank you for referring Wendy Ayala to me for evaluation. Below are my notes for this consultation.  If you have questions, please do not hesitate to call me. I look forward to following your patient along with you.       Sincerely,     Richie Rasmussen MD      CC: Fide Roca MD  ______________________________________________________________________________________            Reason for Consult:  Follow-up and Post-op Visit     Subjective  History Of Present Illness:  Wendy Ayala is a 69 y.o. 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 She has a history of encephalocele and CSF leak repair by Dr. Greenwood on right.    She is now status post left sided tympanoplasty with composite cartilage graft on 5/16/24. She healed well.     On 08/2024 there was an area of the EAC which had not epithelialized and gentian violet was applied. She had fluid behind the TM. For that reason she was started on Flonase.    She has been using the Flonase. She feels that her hearing has improved, but she continues to have fullness.      Past Medical History:  She has a past medical history of Acid reflux, HTN (hypertension), and Type 2 diabetes mellitus (Multi).    Surgical History:  She has a past surgical history that includes Colonoscopy; Other surgical history; Other surgical history; Other surgical history; Other surgical history; and Other surgical history.     Social History:  She reports that she has never smoked. She has been exposed to tobacco smoke. She has never used smokeless tobacco. She reports that she does not currently use alcohol. She reports that she does not use drugs.    Family History:  family history is not on file.      Medications:  Current Outpatient Medications   Medication Instructions   • acetaminophen (TYLENOL) 650 mg, oral, Every 6 hours PRN   • albuterol 90 mcg/actuation inhaler INHALE 2 PUFFS BY MOUTH EVERY 4 HOURS as instructed AS NEEDED FOR WHEEZING, SHORTNESS OF BREATH   • ciprofloxacin-dexamethasone (Ciprodex) otic suspension 5 drops, Left Ear, Daily, Start drops to the left ear 1 week before your follow-up appointment with Dr. Javier   • fluticasone (Flonase) 50 mcg/actuation nasal spray 16   • hydroCHLOROthiazide (HYDRODiuril) 25 mg tablet 90   • ibuprofen 600 mg, oral, Every 6 hours PRN   • metFORMIN XR (GLUCOPHAGE-XR) 500 mg, oral, Daily RT   • metoprolol succinate XL (Toprol-XL) 100 mg 24 hr tablet 90   • omeprazole (PRILOSEC) 40 mg, oral, Daily   • ondansetron ODT (ZOFRAN-ODT) 4 mg, oral, Every 8 hours PRN   • oxybutynin XL (DITROPAN-XL) 10 mg, oral, Daily   • rosuvastatin (CRESTOR) 10 mg, oral, Daily RT   • traMADol (ULTRAM) 50 mg, oral, Every 4 hours PRN      Allergies:  Patient has no known allergies.    Review of Systems:   A comprehensive 10-point review of systems was obtained including constitutional, neurological, HEENT, pulmonary, cardiovascular, genito-urinary, and other pertinent systems and was negative except as noted in the HPI.     Objective  Physical Exam:  Last Recorded Vitals: There were no vitals taken for this visit.    On physical exam, the patient is a well-nourished, well-developed patient, in no acute distress, able to communicate without assistance in English language. Head and face is atraumatic and normocephalic. Salivary glands are intact. Facial strength is symmetrical bilaterally.       On ear examination:  Right ear: The patient has an open and patent ear canal. The tympanic membrane is intact.  AC>BC  Left ear: The patient has an open and patent ear canal. She still has a little bit of exposed bone, although the edges of the skin are atypical. I refreshed the edges again. The jason  tympanum is intact.   AC>BC  The Nelson is left    On vestibular exam, the patient has no spontaneous nystagmus, no headshake nystagmus, no head-thrust nystagmus, and no nystagmus on hyperventilation or Valsalva maneuvers. Kelsey-Hallpike maneuver is negative bilaterally.       On neuro exam, the patient is alert and oriented x3, cranial nerves are grossly intact, cerebellar exam is normal.      The rest of the exam, including anterior rhinoscopy, oropharyngeal exam, neck exam, and cardiovascular exam, were normal including no palpable lymphadenopathies, thyroid in the midline position, normal pulses, and normal chest excursion.         Reviewed Results:  Audiology Testing:  I personally reviewed the audiogram from 10/2024 that showed:   Right ear: moderate downsloping to severe sensorineural hearing loss. Discrimination: 80%   Left ear: moderate downsloping to severe sensorineural hearing loss with clsure of the air-bone gap within 10dB . Discrimination: 84%         I personally reviewed the audiogram from 04/2024 which showed a moderate severe sloping to severe mixed hearing loss bilateral with  15 dB of air-bone gap in the low frequencies. She has 44% discrimination on the left and 84% on the right.       I personally reviewed the audiogram from 01/2024 from Dr. Zuleta which showed a moderate severe sloping to severe mixed hearing loss in the left ear. And 20-25 dB of air-bone gap. The right ear was not tested.          Assessment/Plan  In summary, Wendy Ayala is a 68 y.o. 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 She has a history of encephalocele and CSF leak repair by Dr. Greenwood on right.    She is now status post left sided tympanoplasty with composite cartilage graft on 5/16/24. She healed well.     On 08/2024 there was an area of the EAC which had not epithelialized and gentian violet was applied. She had fluid behind the TM. For that reason she  was started on Flonase.    Since the last visit, her hearing has improved and we achieved closure of the ABG within 10 dB. She still has some fullness. She still has a little bit of exposed bone and I refreshed the edges of the skin on the ear canal. She's going to start wearing her hearing aid on the left in three weeks.    Follow up in 3 months with Audiogram.            __________________________________________________  Richie Javier MD  Professor and Chief   Otology/Neurotology/Lateral Skull-Base Surgery   [facility]    Scribe Attestation  By signing my name below, IEimly, Scribe   attest that this documentation has been prepared under the direction and in the presence of Richie Rasmussen MD.

## 2024-10-15 NOTE — PROGRESS NOTES
Reason for Consult:  Follow-up and Post-op Visit     Subjective   History Of Present Illness:  Wendy Ayala is a 69 y.o. 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 She has a history of encephalocele and CSF leak repair by Dr. Greenwood on right.    She is now status post left sided tympanoplasty with composite cartilage graft on 5/16/24. She healed well.     On 08/2024 there was an area of the EAC which had not epithelialized and gentian violet was applied. She had fluid behind the TM. For that reason she was started on Flonase.    She has been using the Flonase. She feels that her hearing has improved, but she continues to have fullness.      Past Medical History:  She has a past medical history of Acid reflux, HTN (hypertension), and Type 2 diabetes mellitus (Multi).    Surgical History:  She has a past surgical history that includes Colonoscopy; Other surgical history; Other surgical history; Other surgical history; Other surgical history; and Other surgical history.     Social History:  She reports that she has never smoked. She has been exposed to tobacco smoke. She has never used smokeless tobacco. She reports that she does not currently use alcohol. She reports that she does not use drugs.    Family History:  family history is not on file.     Medications:  Current Outpatient Medications   Medication Instructions    acetaminophen (TYLENOL) 650 mg, oral, Every 6 hours PRN    albuterol 90 mcg/actuation inhaler INHALE 2 PUFFS BY MOUTH EVERY 4 HOURS as instructed AS NEEDED FOR WHEEZING, SHORTNESS OF BREATH    ciprofloxacin-dexamethasone (Ciprodex) otic suspension 5 drops, Left Ear, Daily, Start drops to the left ear 1 week before your follow-up appointment with Dr. Javier    fluticasone (Flonase) 50 mcg/actuation nasal spray 16    hydroCHLOROthiazide (HYDRODiuril) 25 mg tablet 90    ibuprofen 600 mg, oral, Every 6 hours PRN    metFORMIN XR  (GLUCOPHAGE-XR) 500 mg, oral, Daily RT    metoprolol succinate XL (Toprol-XL) 100 mg 24 hr tablet 90    omeprazole (PRILOSEC) 40 mg, oral, Daily    ondansetron ODT (ZOFRAN-ODT) 4 mg, oral, Every 8 hours PRN    oxybutynin XL (DITROPAN-XL) 10 mg, oral, Daily    rosuvastatin (CRESTOR) 10 mg, oral, Daily RT    traMADol (ULTRAM) 50 mg, oral, Every 4 hours PRN      Allergies:  Patient has no known allergies.    Review of Systems:   A comprehensive 10-point review of systems was obtained including constitutional, neurological, HEENT, pulmonary, cardiovascular, genito-urinary, and other pertinent systems and was negative except as noted in the HPI.     Objective   Physical Exam:  Last Recorded Vitals: There were no vitals taken for this visit.    On physical exam, the patient is a well-nourished, well-developed patient, in no acute distress, able to communicate without assistance in English language. Head and face is atraumatic and normocephalic. Salivary glands are intact. Facial strength is symmetrical bilaterally.       On ear examination:  Right ear: The patient has an open and patent ear canal. The tympanic membrane is intact.  AC>BC  Left ear: The patient has an open and patent ear canal. She still has a little bit of exposed bone, although the edges of the skin are atypical. I refreshed the edges again. The jason tympanum is intact.   AC>BC  The Nelson is left    On vestibular exam, the patient has no spontaneous nystagmus, no headshake nystagmus, no head-thrust nystagmus, and no nystagmus on hyperventilation or Valsalva maneuvers. Rochester-Hallpike maneuver is negative bilaterally.       On neuro exam, the patient is alert and oriented x3, cranial nerves are grossly intact, cerebellar exam is normal.      The rest of the exam, including anterior rhinoscopy, oropharyngeal exam, neck exam, and cardiovascular exam, were normal including no palpable lymphadenopathies, thyroid in the midline position, normal pulses, and normal  chest excursion.         Reviewed Results:  Audiology Testing:  I personally reviewed the audiogram from 10/2024 that showed:   Right ear: moderate downsloping to severe sensorineural hearing loss. Discrimination: 80%   Left ear: moderate downsloping to severe sensorineural hearing loss with clsure of the air-bone gap within 10dB . Discrimination: 84%         I personally reviewed the audiogram from 04/2024 which showed a moderate severe sloping to severe mixed hearing loss bilateral with  15 dB of air-bone gap in the low frequencies. She has 44% discrimination on the left and 84% on the right.       I personally reviewed the audiogram from 01/2024 from Dr. Zuleta which showed a moderate severe sloping to severe mixed hearing loss in the left ear. And 20-25 dB of air-bone gap. The right ear was not tested.          Assessment/Plan   In summary, Wendy Ayala is a 68 y.o. 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 She has a history of encephalocele and CSF leak repair by Dr. Greenwood on right.    She is now status post left sided tympanoplasty with composite cartilage graft on 5/16/24. She healed well.     On 08/2024 there was an area of the EAC which had not epithelialized and gentian violet was applied. She had fluid behind the TM. For that reason she was started on Flonase.    Since the last visit, her hearing has improved and we achieved closure of the ABG within 10 dB. She still has some fullness. She still has a little bit of exposed bone and I refreshed the edges of the skin on the ear canal. She's going to start wearing her hearing aid on the left in three weeks.    Follow up in 3 months with Audiogram.            __________________________________________________  Richie Javier MD  Professor and Chief   Otology/Neurotology/Lateral Skull-Base Surgery   [facility]    Scribe Attestation  By signing my name below,  I, Teodoro Juares   attest that this documentation has been prepared under the direction and in the presence of Richie Rasmussen MD.

## 2024-10-15 NOTE — PROGRESS NOTES
"AUDIOLOGY ADULT AUDIOMETRIC EVALUATION      Name:  Wendy Ayala   :  1955  Age:  69 y.o.  Date of Evaluation:  10/15/2024    Time: 8017-3266    IMPRESSIONS     Moderate to profound sensorineural hearing loss in the right ear, and moderately to profound mixed hearing loss in the left ear.  Word understanding in quiet is good in both ears.       RECOMMENDATIONS     Continue medical follow up with primary care provider and/or Ears Nose and Throat (ENT) provider as recommended.  Return for audiologic evaluation in conjunction with medical management to monitor hearing sensitivity and assess middle ear status or sooner should concerns arise. The audiology department can be reached at [phone] to schedule an appointment.   Avoid exposure to loud sounds by moving away from the noise, turning down the volume, or wearing proper hearing protection correctly.    HISTORY     Reason for visit:  Ms. Ayala is seen today for an add-on audiologic evaluation in conjunction with an evaluation with Richie Rasmussen MD due to recent tympanoplasty of the left ear and known hearing loss in both ears. Previous audio was performed on 24 and revealed  mild to severe sensorineural hearing loss in the right ear and mild to profound mixed hearing loss in the left ear.  History obtained from patient report and chart review. Patient reports a decline in hearing, especially in the left ear since her last hearing test. She denies pain or fullness in her ears. Endorses left aural fullness/pressure recently.     EVALUATION     See scanned audiogram in \"Media\"     TEST RESULTS     Otoscopic Evaluation:  Right Ear:  Ear canal clear, tympanic membrane visualized.  Left Ear:  Blood in ear canal.    Tympanometry (226 Hz):  Right Ear: Did not test  Left Ear: Unable to test due to debris in ear canal    Acoustic Reflexes:   Right Ear: Did not test.  Left Ear: Did not test.    Test technique:  Pure Tone Audiometry via " headphones  Reliability: Good    Pure Tone Audiometry:    Right Ear: Moderate sloping profound sensorineural hearing loss  Left Ear: Moderate sloping profound mixed hearing loss    Speech Audiometry:   Right Ear:  Speech Reception Threshold (SRT) was obtained at 40 dB HL. Word Recognition scores were good (80%) in quiet when words were presented at 85 dB HL. These results are based on [facility] Auditory Test No.6 (NU-6) (N=25).   Left Ear:  Speech Reception Threshold (SRT) was obtained at 50 dB HL. Word Recognition scores were good (84%) in quiet when words were presented at 90 dB HL. These results are based on [facility] Auditory Test No.6 (NU-6) (N=25).     Comparison of today's results with previous test results:  Progressive since last evaluation on 8/9/24         PATIENT EDUCATION     Discussed results and recommendations with Ms. Ayala. Questions were addressed and the patient was encouraged to contact our department at [phone] should concerns arise.     FOSTER Rosa. [facility]  Audiology Graduate Clinician    Milagros Staley CCC-A  Licensed Clinical Audiologist

## 2024-11-11 ENCOUNTER — APPOINTMENT (OUTPATIENT)
Dept: AUDIOLOGY | Facility: CLINIC | Age: 69
End: 2024-11-11
Payer: MEDICARE

## 2024-12-02 ENCOUNTER — APPOINTMENT (OUTPATIENT)
Dept: OTOLARYNGOLOGY | Facility: CLINIC | Age: 69
End: 2024-12-02
Payer: MEDICARE

## 2025-01-22 ENCOUNTER — APPOINTMENT (OUTPATIENT)
Dept: OTOLARYNGOLOGY | Facility: CLINIC | Age: 70
End: 2025-01-22
Payer: MEDICARE

## 2025-01-22 ENCOUNTER — APPOINTMENT (OUTPATIENT)
Dept: AUDIOLOGY | Facility: CLINIC | Age: 70
End: 2025-01-22
Payer: MEDICARE

## 2025-03-26 ENCOUNTER — APPOINTMENT (OUTPATIENT)
Dept: OTOLARYNGOLOGY | Facility: CLINIC | Age: 70
End: 2025-03-26
Payer: MEDICARE

## 2025-03-26 ENCOUNTER — APPOINTMENT (OUTPATIENT)
Dept: AUDIOLOGY | Facility: CLINIC | Age: 70
End: 2025-03-26
Payer: MEDICARE

## 2025-03-26 VITALS
BODY MASS INDEX: 24.63 KG/M2 | DIASTOLIC BLOOD PRESSURE: 76 MMHG | SYSTOLIC BLOOD PRESSURE: 129 MMHG | HEART RATE: 44 BPM | WEIGHT: 162 LBS

## 2025-03-26 DIAGNOSIS — H66.3X2 CHRONIC SUPPURATIVE OTITIS MEDIA OF LEFT EAR, UNSPECIFIED OTITIS MEDIA LOCATION: ICD-10-CM

## 2025-03-26 DIAGNOSIS — H72.92 PERFORATION OF LEFT TYMPANIC MEMBRANE: ICD-10-CM

## 2025-03-26 DIAGNOSIS — H61.22 IMPACTED CERUMEN OF LEFT EAR: ICD-10-CM

## 2025-03-26 DIAGNOSIS — H90.A21 SENSORINEURAL HEARING LOSS (SNHL) OF RIGHT EAR WITH RESTRICTED HEARING OF LEFT EAR: Primary | ICD-10-CM

## 2025-03-26 DIAGNOSIS — H90.3 SENSORINEURAL HEARING LOSS, BILATERAL: ICD-10-CM

## 2025-03-26 DIAGNOSIS — H90.A32 MIXED CONDUCTIVE AND SENSORINEURAL HEARING LOSS OF LEFT EAR WITH RESTRICTED HEARING OF RIGHT EAR: ICD-10-CM

## 2025-03-26 DIAGNOSIS — H69.93 DYSFUNCTION OF BOTH EUSTACHIAN TUBES: ICD-10-CM

## 2025-03-26 DIAGNOSIS — H72.92 PERFORATION OF LEFT TYMPANIC MEMBRANE: Primary | ICD-10-CM

## 2025-03-26 PROCEDURE — 69210 REMOVE IMPACTED EAR WAX UNI: CPT | Performed by: OTOLARYNGOLOGY

## 2025-03-26 PROCEDURE — 92567 TYMPANOMETRY: CPT | Performed by: AUDIOLOGIST

## 2025-03-26 PROCEDURE — 92557 COMPREHENSIVE HEARING TEST: CPT | Performed by: AUDIOLOGIST

## 2025-03-26 PROCEDURE — 3078F DIAST BP <80 MM HG: CPT | Performed by: OTOLARYNGOLOGY

## 2025-03-26 PROCEDURE — 1160F RVW MEDS BY RX/DR IN RCRD: CPT | Performed by: OTOLARYNGOLOGY

## 2025-03-26 PROCEDURE — 99213 OFFICE O/P EST LOW 20 MIN: CPT | Performed by: OTOLARYNGOLOGY

## 2025-03-26 PROCEDURE — 1159F MED LIST DOCD IN RCRD: CPT | Performed by: OTOLARYNGOLOGY

## 2025-03-26 PROCEDURE — 3074F SYST BP LT 130 MM HG: CPT | Performed by: OTOLARYNGOLOGY

## 2025-03-26 RX ORDER — BLOOD-GLUCOSE METER
EACH MISCELLANEOUS
COMMUNITY
Start: 2024-08-23

## 2025-03-26 RX ORDER — ROSUVASTATIN CALCIUM 5 MG/1
5 TABLET, COATED ORAL NIGHTLY
COMMUNITY
Start: 2025-01-05

## 2025-03-26 NOTE — PROGRESS NOTES
Reason for Consult:  Follow-up and Post-op Visit     Subjective   History Of Present Illness:  Wendy Ayala is a 69 y.o. 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 She has a history of encephalocele and CSF leak repair by Dr. Greenwood on right.    She is now status post left sided tympanoplasty with composite cartilage graft on 5/16/24. She healed well.     On 08/2024 there was an area of the EAC which had not epithelialized and gentian violet was applied. She had fluid behind the TM. For that reason she was started on Flonase.    She uses Flonase intermittently when she has pressure. Ears feel good currently. Using hearing aids bilaterally.      Past Medical History:  She has a past medical history of Acid reflux, HTN (hypertension), and Type 2 diabetes mellitus.    Surgical History:  She has a past surgical history that includes Colonoscopy; Other surgical history; Other surgical history; Other surgical history; Other surgical history; and Other surgical history.     Social History:  She reports that she has never smoked. She has never been exposed to tobacco smoke. She has never used smokeless tobacco. She reports that she does not currently use alcohol. She reports that she does not use drugs.    Family History:  family history is not on file.     Medications:  Current Outpatient Medications   Medication Instructions    acetaminophen (TYLENOL) 650 mg, oral, Every 6 hours PRN    albuterol 90 mcg/actuation inhaler INHALE 2 PUFFS BY MOUTH EVERY 4 HOURS as instructed AS NEEDED FOR WHEEZING, SHORTNESS OF BREATH    ciprofloxacin-dexamethasone (Ciprodex) otic suspension 5 drops, Left Ear, Daily, Start drops to the left ear 1 week before your follow-up appointment with Dr. Javier    fluticasone (Flonase) 50 mcg/actuation nasal spray 16    hydroCHLOROthiazide (HYDRODiuril) 25 mg tablet 90    ibuprofen 600 mg, oral, Every 6 hours PRN    metFORMIN XR  (GLUCOPHAGE-XR) 500 mg, oral, Daily RT    metoprolol succinate XL (Toprol-XL) 100 mg 24 hr tablet 90    omeprazole (PRILOSEC) 40 mg, oral, Daily    ondansetron ODT (ZOFRAN-ODT) 4 mg, oral, Every 8 hours PRN    oxybutynin XL (DITROPAN-XL) 10 mg, oral, Daily    rosuvastatin (CRESTOR) 10 mg, oral, Daily RT    traMADol (ULTRAM) 50 mg, oral, Every 4 hours PRN      Allergies:  Patient has no known allergies.    Review of Systems:   A comprehensive 10-point review of systems was obtained including constitutional, neurological, HEENT, pulmonary, cardiovascular, genito-urinary, and other pertinent systems and was negative except as noted in the HPI.     Objective   Physical Exam:  Last Recorded Vitals: There were no vitals taken for this visit.    On physical exam, the patient is a well-nourished, well-developed patient, in no acute distress, able to communicate without assistance in English language. Head and face is atraumatic and normocephalic. Salivary glands are intact. Facial strength is symmetrical bilaterally.       On ear examination:  Right ear: The patient has an open and patent ear canal. The tympanic membrane is intact.  AC>BC  Left ear: The patient has an open and patent ear canal. She still has a little bit of exposed bone, although the edges of the skin are trying to heal. I refreshed the edges again. The jason tympanum is intact.   AC>BC  The Nelson is midline    On vestibular exam, the patient has no spontaneous nystagmus, no headshake nystagmus, no head-thrust nystagmus, and no nystagmus on hyperventilation or Valsalva maneuvers. Kelsey-Hallpike maneuver is negative bilaterally.       On neuro exam, the patient is alert and oriented x3, cranial nerves are grossly intact, cerebellar exam is normal.      The rest of the exam, including anterior rhinoscopy, oropharyngeal exam, neck exam, and cardiovascular exam, were normal including no palpable lymphadenopathies, thyroid in the midline position, normal pulses, and  normal chest excursion.         Reviewed Results:  Audiology Testing:  I personally reviewed the audiogram from 3/26/25 that showed:         I personally reviewed the audiogram from 10/2024 that showed:   Right ear: moderate downsloping to severe sensorineural hearing loss. Discrimination: 80%   Left ear: moderate downsloping to severe sensorineural hearing loss with clsure of the air-bone gap within 10dB . Discrimination: 84%         I personally reviewed the audiogram from 04/2024 which showed a moderate severe sloping to severe mixed hearing loss bilateral with  15 dB of air-bone gap in the low frequencies. She has 44% discrimination on the left and 84% on the right.       I personally reviewed the audiogram from 01/2024 from Dr. Zuleta which showed a moderate severe sloping to severe mixed hearing loss in the left ear. And 20-25 dB of air-bone gap. The right ear was not tested.          Cerumen Removal  Ear(s): left  A copious amount of wax blocked the external auditory canal, which prevented proper examination of the canal and tympanic membrane.  Therefore, using the binocular tianna-microscope I removed the wax with micro-otologic instruments. This includes a Khan pick, loop curette and otologic suctions. After the wax removal and the canal was clean, I continued my exam of the external auditory canal and tympanic membrane. Please see the above exam section for exam details. The patient tolerated the procedure well.       Assessment/Plan   In summary, Wendy Ayala is a 68 y.o. 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 She has a history of encephalocele and CSF leak repair by Dr. Greenwood on right.    She is now status post left sided tympanoplasty with composite cartilage graft on 5/16/24. She healed well.     On 08/2024 there was an area of the EAC which had not epithelialized and gentian violet was applied. She had fluid behind the TM. For that  reason she was started on Flonase with resolution of her symptoms.    Since the last visit, her hearing has improved and we achieved closure of the ABG within 5 dB. She still has a little bit of exposed bone and I refreshed the edges of the skin on the ear canal. She's going to start wearing her hearing aid on the left in 1 week. Continue Flonase PRN.    Follow up in 1 year with Audiogram.        Clay Hernandez DO  Otolaryngology Resident (PGY-3)      __________________________________________________  Richie Javier MD  Professor and Chief   Otology/Neurotology/Lateral Skull-Base Surgery   [facility]    Scribe Attestation  By signing my name below, I, Amrita Streeterjessica, Scribe   attest that this documentation has been prepared under the direction and in the presence of Richie Rasmussen MD.

## 2025-03-26 NOTE — PROGRESS NOTES
COMPREHENSIVE AUDIOMETRIC EVALUATION      Name:  Wendy Ayala  :  1955  Age:  69 y.o.  Date of Evaluation:  25   Referring Provider:   Richie Rasmussen MD       History:  Ms. Ayala was seen today for an evaluation of hearing.  Per Richie Rasmussen MD report 10/15/2024: In summary, Wendy Ayala is a 68 y.o. 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 She has a history of encephalocele and CSF leak repair by Dr. Greenwood on right. She is now status post left sided tympanoplasty with composite cartilage graft on 24. She healed well.      Today, patient reported aural fullness, bilaterally.  When asked, patient denied otalgia. NOTE: Patient arrived 23 minutes late to today's 30 minute appointment. Patient was seen as a courtesy for ENT provider.  Limited case history information able to be obtained.    See audiometric evaluation at end of this report or scanned under media tab    OTOSCOPY:       Right Ear: Minimal non-occluding cerumen, abnormal otoscopy consistent with surgical history       Left Ear: Minimal non-occluding cerumen, abnormal otoscopy consistent with surgical history    226 Hz TYMPANOMETRY:       Right Ear: Type B: Flat with normal ear canal volume       Left Ear: Type B: Flat with normal ear canal volume    AUDIOMETRIC EVALUATION (Phones):       Right Ear: Moderate through 500 Hz sloping to Severe, Sensorineural hearing loss                 Left Ear: Moderate through 500 Hz sloping to Profound, likely Mixed hearing loss           NOTE: Hearing sensitivity improved in the left ear at 500 Hz and 6000 Hz as compared to most recent audiometric evaluation 10/15/2024    Test technique:  Standard Audiometry  Reliability:   good    SPEECH RECOGNITION THRESHOLD:       Right Ear:  35 dBHL in poor agreement with PTA       Left Ear:  50 dBHL in good agreement with PTA    WORD RECOGNITION:       Right Ear:  92%  excellent  (%) at elevated presentation level       Left Ear:   84% good (78-88%) at elevated presentation level    IPSILATERAL ACOUSTIC REFLEXES: CNT due to type B tympanometry    DISCUSSION:   Discussed results and recommendations with patient.  Questions were addressed and the patient was encouraged to contact our department should concerns arise.    RECOMMENDATIONS:  -Recommend patient continue consistent utilization of hearing aids and follow up with audiologist.  -Recommend patient return for repeated audiometric evaluation should concerns for changes in hearing sensitivity arise or as medically indicated.    Ramon Parra, CCC-A     Appt: 1:53 - 2:15 PM

## 2025-03-26 NOTE — LETTER
March 30, 2025     Jose Zuleta MD  [address]  [place] [id number]    Patient: Wendy Ayala   YOB: 1955   Date of Visit: 3/26/2025       Dear Dr. Jose Zuleta MD:    Thank you for referring Wendy Ayala to me for evaluation. Below are my notes for this consultation.  If you have questions, please do not hesitate to call me. I look forward to following your patient along with you.       Sincerely,     Richie Rasmussen MD      CC: Fide Roca MD  ______________________________________________________________________________________            Reason for Consult:  Follow-up and Post-op Visit     Subjective  History Of Present Illness:  Wendy Ayala is a 69 y.o. 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 She has a history of encephalocele and CSF leak repair by Dr. Greenwood on right.    She is now status post left sided tympanoplasty with composite cartilage graft on 5/16/24. She healed well.     On 08/2024 there was an area of the EAC which had not epithelialized and gentian violet was applied. She had fluid behind the TM. For that reason she was started on Flonase.    She uses Flonase intermittently when she has pressure. Ears feel good currently. Using hearing aids bilaterally.      Past Medical History:  She has a past medical history of Acid reflux, HTN (hypertension), and Type 2 diabetes mellitus.    Surgical History:  She has a past surgical history that includes Colonoscopy; Other surgical history; Other surgical history; Other surgical history; Other surgical history; and Other surgical history.     Social History:  She reports that she has never smoked. She has never been exposed to tobacco smoke. She has never used smokeless tobacco. She reports that she does not currently use alcohol. She reports that she does not use drugs.    Family History:  family history is not on file.      Medications:  Current Outpatient Medications   Medication Instructions   • acetaminophen (TYLENOL) 650 mg, oral, Every 6 hours PRN   • albuterol 90 mcg/actuation inhaler INHALE 2 PUFFS BY MOUTH EVERY 4 HOURS as instructed AS NEEDED FOR WHEEZING, SHORTNESS OF BREATH   • ciprofloxacin-dexamethasone (Ciprodex) otic suspension 5 drops, Left Ear, Daily, Start drops to the left ear 1 week before your follow-up appointment with Dr. Javier   • fluticasone (Flonase) 50 mcg/actuation nasal spray 16   • hydroCHLOROthiazide (HYDRODiuril) 25 mg tablet 90   • ibuprofen 600 mg, oral, Every 6 hours PRN   • metFORMIN XR (GLUCOPHAGE-XR) 500 mg, oral, Daily RT   • metoprolol succinate XL (Toprol-XL) 100 mg 24 hr tablet 90   • omeprazole (PRILOSEC) 40 mg, oral, Daily   • ondansetron ODT (ZOFRAN-ODT) 4 mg, oral, Every 8 hours PRN   • oxybutynin XL (DITROPAN-XL) 10 mg, oral, Daily   • rosuvastatin (CRESTOR) 10 mg, oral, Daily RT   • traMADol (ULTRAM) 50 mg, oral, Every 4 hours PRN      Allergies:  Patient has no known allergies.    Review of Systems:   A comprehensive 10-point review of systems was obtained including constitutional, neurological, HEENT, pulmonary, cardiovascular, genito-urinary, and other pertinent systems and was negative except as noted in the HPI.     Objective  Physical Exam:  Last Recorded Vitals: There were no vitals taken for this visit.    On physical exam, the patient is a well-nourished, well-developed patient, in no acute distress, able to communicate without assistance in English language. Head and face is atraumatic and normocephalic. Salivary glands are intact. Facial strength is symmetrical bilaterally.       On ear examination:  Right ear: The patient has an open and patent ear canal. The tympanic membrane is intact.  AC>BC  Left ear: The patient has an open and patent ear canal. She still has a little bit of exposed bone, although the edges of the skin are trying to heal. I refreshed the edges again.  The jason tympanum is intact.   AC>BC  The Nelson is midline    On vestibular exam, the patient has no spontaneous nystagmus, no headshake nystagmus, no head-thrust nystagmus, and no nystagmus on hyperventilation or Valsalva maneuvers. Kelsey-Hallpike maneuver is negative bilaterally.       On neuro exam, the patient is alert and oriented x3, cranial nerves are grossly intact, cerebellar exam is normal.      The rest of the exam, including anterior rhinoscopy, oropharyngeal exam, neck exam, and cardiovascular exam, were normal including no palpable lymphadenopathies, thyroid in the midline position, normal pulses, and normal chest excursion.         Reviewed Results:  Audiology Testing:  I personally reviewed the audiogram from 3/26/25 that showed:         I personally reviewed the audiogram from 10/2024 that showed:   Right ear: moderate downsloping to severe sensorineural hearing loss. Discrimination: 80%   Left ear: moderate downsloping to severe sensorineural hearing loss with clsure of the air-bone gap within 10dB . Discrimination: 84%         I personally reviewed the audiogram from 04/2024 which showed a moderate severe sloping to severe mixed hearing loss bilateral with  15 dB of air-bone gap in the low frequencies. She has 44% discrimination on the left and 84% on the right.       I personally reviewed the audiogram from 01/2024 from Dr. Zuleta which showed a moderate severe sloping to severe mixed hearing loss in the left ear. And 20-25 dB of air-bone gap. The right ear was not tested.          Cerumen Removal  Ear(s): left  A copious amount of wax blocked the external auditory canal, which prevented proper examination of the canal and tympanic membrane.  Therefore, using the binocular tianna-microscope I removed the wax with micro-otologic instruments. This includes a Khan pick, loop curette and otologic suctions. After the wax removal and the canal was clean, I continued my exam of the external auditory canal  and tympanic membrane. Please see the above exam section for exam details. The patient tolerated the procedure well.       Assessment/Plan  In summary, Wendy Ayala is a 68 y.o. 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 She has a history of encephalocele and CSF leak repair by Dr. Greenwood on right.    She is now status post left sided tympanoplasty with composite cartilage graft on 5/16/24. She healed well.     On 08/2024 there was an area of the EAC which had not epithelialized and gentian violet was applied. She had fluid behind the TM. For that reason she was started on Flonase with resolution of her symptoms.    Since the last visit, her hearing has improved and we achieved closure of the ABG within 5 dB. She still has a little bit of exposed bone and I refreshed the edges of the skin on the ear canal. She's going to start wearing her hearing aid on the left in 1 week. Continue Flonase PRN.    Follow up in 1 year with Audiogram.        Clay Hernandez DO  Otolaryngology Resident (PGY-3)      __________________________________________________  Richie Javier MD  Professor and Chief   Otology/Neurotology/Lateral Skull-Base Surgery   [facility]    Scribe Attestation  By signing my name below, IAmrita, Scribe   attest that this documentation has been prepared under the direction and in the presence of Richie Rasmussen MD.

## 2025-04-09 ENCOUNTER — APPOINTMENT (OUTPATIENT)
Dept: AUDIOLOGY | Facility: CLINIC | Age: 70
End: 2025-04-09
Payer: MEDICARE

## 2025-04-09 ENCOUNTER — APPOINTMENT (OUTPATIENT)
Dept: OTOLARYNGOLOGY | Facility: CLINIC | Age: 70
End: 2025-04-09
Payer: MEDICARE

## 2025-05-09 DIAGNOSIS — H65.23 BILATERAL CHRONIC SEROUS OTITIS MEDIA: Primary | ICD-10-CM

## 2025-05-09 RX ORDER — FLUTICASONE PROPIONATE 50 MCG
2 SPRAY, SUSPENSION (ML) NASAL DAILY
Qty: 16 G | Refills: 3 | Status: SHIPPED | OUTPATIENT
Start: 2025-05-09 | End: 2025-05-23

## 2025-05-09 RX ORDER — AZELASTINE 1 MG/ML
2 SPRAY, METERED NASAL 2 TIMES DAILY
Qty: 30 ML | Refills: 3 | Status: SHIPPED | OUTPATIENT
Start: 2025-05-09 | End: 2025-05-23

## 2025-07-09 ENCOUNTER — PROCEDURE VISIT (OUTPATIENT)
Dept: OTOLARYNGOLOGY | Facility: HOSPITAL | Age: 70
End: 2025-07-09
Payer: MEDICARE

## 2025-07-09 VITALS — SYSTOLIC BLOOD PRESSURE: 177 MMHG | HEART RATE: 51 BPM | TEMPERATURE: 97.6 F | DIASTOLIC BLOOD PRESSURE: 76 MMHG

## 2025-07-09 DIAGNOSIS — H72.92 PERFORATION OF LEFT TYMPANIC MEMBRANE: Primary | ICD-10-CM

## 2025-07-09 DIAGNOSIS — H65.23 BILATERAL CHRONIC SEROUS OTITIS MEDIA: ICD-10-CM

## 2025-07-09 DIAGNOSIS — H90.A32 MIXED CONDUCTIVE AND SENSORINEURAL HEARING LOSS OF LEFT EAR WITH RESTRICTED HEARING OF RIGHT EAR: ICD-10-CM

## 2025-07-09 DIAGNOSIS — H66.3X2 CHRONIC SUPPURATIVE OTITIS MEDIA OF LEFT EAR, UNSPECIFIED OTITIS MEDIA LOCATION: ICD-10-CM

## 2025-07-09 DIAGNOSIS — H90.3 SENSORINEURAL HEARING LOSS, BILATERAL: ICD-10-CM

## 2025-07-09 PROCEDURE — 99214 OFFICE O/P EST MOD 30 MIN: CPT | Performed by: OTOLARYNGOLOGY

## 2025-07-09 RX ORDER — FLUTICASONE PROPIONATE 50 MCG
2 SPRAY, SUSPENSION (ML) NASAL DAILY
Qty: 16 G | Refills: 3 | Status: SHIPPED | OUTPATIENT
Start: 2025-07-09 | End: 2025-09-07

## 2025-07-09 RX ORDER — AZELASTINE 1 MG/ML
2 SPRAY, METERED NASAL 2 TIMES DAILY
Qty: 30 ML | Refills: 3 | Status: SHIPPED | OUTPATIENT
Start: 2025-07-09 | End: 2025-09-07

## 2025-07-09 NOTE — PROGRESS NOTES
Reason for Consult:  Follow-up and Post-op Visit     Subjective   History Of Present Illness:  Wendy Ayala is a 69 y.o. 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 She has a history of encephalocele and CSF leak repair by Dr. Greenwood on right.    She is now status post left sided tympanoplasty with composite cartilage graft on 5/16/24. She healed well.     On 08/2024 there was an area of the EAC which had not epithelialized and gentian violet was applied. She had fluid behind the TM. For that reason she was started on Flonase.    She uses Flonase intermittently when she has pressure. Ears feel good currently. Using hearing aids bilaterally.     Today, she presents with 2 month history of aural fullness and possible decreased hearing on the left side. She denies any pain or drainage. No fever or chills. No other acute change.     Past Medical History:  She has a past medical history of Acid reflux, HL (hearing loss) (2001), HTN (hypertension), and Type 2 diabetes mellitus.    Surgical History:  She has a past surgical history that includes Colonoscopy; Other surgical history; Other surgical history; Other surgical history; Other surgical history; Other surgical history; Tympanostomy tube placement (1976); and Mastoidectomy (2001).     Social History:  She reports that she has never smoked. She has never been exposed to tobacco smoke. She has never used smokeless tobacco. She reports that she does not currently use alcohol. She reports that she does not use drugs.    Family History:  family history includes Diabetes in her sister and sister; Hearing loss in her mother; Heart failure in her mother; Hypertension in her mother; Stroke in her father.     Medications:  Current Outpatient Medications   Medication Instructions    acetaminophen (TYLENOL) 650 mg, oral, Every 6 hours PRN    albuterol 90 mcg/actuation inhaler INHALE 2 PUFFS BY MOUTH EVERY 4  HOURS as instructed AS NEEDED FOR WHEEZING, SHORTNESS OF BREATH    azelastine (Astelin) 137 mcg (0.1 %) nasal spray 2 sprays, Each Nostril, 2 times daily, Use in each nostril as directed    blood sugar diagnostic strip Use with blood glucose test once daily    ciprofloxacin-dexamethasone (Ciprodex) otic suspension 5 drops, Left Ear, Daily, Start drops to the left ear 1 week before your follow-up appointment with Dr. Yaya Karimi Lancets 30 gauge misc test BLOOD SUGAR ONCE DAILY    fluticasone (Flonase) 50 mcg/actuation nasal spray 2 sprays, Each Nostril, Daily, Shake gently. Before first use, prime pump. After use, clean tip and replace cap.    hydroCHLOROthiazide (HYDRODiuril) 25 mg tablet 90    ibuprofen 600 mg, oral, Every 6 hours PRN    metFORMIN XR (GLUCOPHAGE-XR) 500 mg, oral, Daily RT    metoprolol succinate XL (Toprol-XL) 100 mg 24 hr tablet 90    omeprazole (PRILOSEC) 40 mg, Daily    ondansetron ODT (ZOFRAN-ODT) 4 mg, oral, Every 8 hours PRN    rosuvastatin (CRESTOR) 5 mg, Nightly    traMADol (ULTRAM) 50 mg, oral, Every 4 hours PRN      Allergies:  Patient has no known allergies.    Review of Systems:   A comprehensive 10-point review of systems was obtained including constitutional, neurological, HEENT, pulmonary, cardiovascular, genito-urinary, and other pertinent systems and was negative except as noted in the HPI.     Objective   Physical Exam:  Last Recorded Vitals: Blood pressure 177/76, pulse 51, temperature 36.4 °C (97.6 °F).    On physical exam, the patient is a well-nourished, well-developed patient, in no acute distress, able to communicate without assistance in English language. Head and face is atraumatic and normocephalic. Salivary glands are intact. Facial strength is symmetrical bilaterally.       On ear examination:  Right ear: The patient has an open and patent ear canal. The tympanic membrane is intact.  AC>BC  Left ear: The patient has an open and patent ear canal. Firm crust present  on infrerior aspect of EAC, partially removed. The jason tympanum is intact. No evidence of infection  BC>AC  The Nelson is left    On vestibular exam, the patient has no spontaneous nystagmus    On neuro exam, the patient is alert and oriented x3, cranial nerves are grossly intact, cerebellar exam is normal.      The rest of the exam, including anterior rhinoscopy, oropharyngeal exam, neck exam, and cardiovascular exam, were normal including no palpable lymphadenopathies, thyroid in the midline position, normal pulses, and normal chest excursion.         Reviewed Results:  Audiology Testing:  I personally reviewed the audiogram from 3/26/25 that showed:         I personally reviewed the audiogram from 10/2024 that showed:   Right ear: moderate downsloping to severe sensorineural hearing loss. Discrimination: 80%   Left ear: moderate downsloping to severe sensorineural hearing loss with clsure of the air-bone gap within 10dB . Discrimination: 84%         I personally reviewed the audiogram from 04/2024 which showed a moderate severe sloping to severe mixed hearing loss bilateral with  15 dB of air-bone gap in the low frequencies. She has 44% discrimination on the left and 84% on the right.       I personally reviewed the audiogram from 01/2024 from Dr. Zuleta which showed a moderate severe sloping to severe mixed hearing loss in the left ear. And 20-25 dB of air-bone gap. The right ear was not tested.          Assessment/Plan   In summary, Wendy Ayala is a 68 y.o. 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 She has a history of encephalocele and CSF leak repair by Dr. Greenwood on right.    She is now status post left sided tympanoplasty with composite cartilage graft on 5/16/24. She healed well.     On 08/2024 there was an area of the EAC which had not epithelialized and gentian violet was applied. She had fluid behind the TM. For that reason she was started  on Flonase with resolution of her symptoms.    He hearing had improved and we achieved closure of the ABG within 5 dB. She had a little bit of exposed bone and I refreshed the edges of the skin on the ear canal.     She presents today with left ear fullness and possible effusion which is supported by tuning fork exam. She had been started on flonase/asteline topical sprays but did not use them for a significant time and is not currently using them. She has had good response to them in the past.    -Restart topical nasal sprays.   -Follow up in 3 months. Will determine if conductive hearing loss remains present and possible need for imaging or surgery      Dangelo Kay MD PGY5  ENT      __________________________________________________  Richie Javier MD  Professor and Chief   Otology/Neurotology/Lateral Skull-Base Surgery   [facility]    Scribe Attestation  By signing my name below, I, Dangelo Kay MD, Scribe   attest that this documentation has been prepared under the direction and in the presence of Richie Rasmussen MD.

## 2025-07-09 NOTE — LETTER
July 22, 2025     Jose Zuleta MD  [address] [facility] [address]  [facility] [id number]    Patient: Wendy Ayala   YOB: 1955   Date of Visit: 7/9/2025       Dear Dr. Jose Zuleta MD:    Thank you for referring Wendy Ayala to me for evaluation. Below are my notes for this consultation.  If you have questions, please do not hesitate to call me. I look forward to following your patient along with you.       Sincerely,     Richie Rasmussen MD      CC: Fide Roca MD  ______________________________________________________________________________________            Reason for Consult:  Follow-up and Post-op Visit     Subjective  History Of Present Illness:  Wendy Ayala is a 69 y.o. 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 She has a history of encephalocele and CSF leak repair by Dr. Greenwood on right.    She is now status post left sided tympanoplasty with composite cartilage graft on 5/16/24. She healed well.     On 08/2024 there was an area of the EAC which had not epithelialized and gentian violet was applied. She had fluid behind the TM. For that reason she was started on Flonase.    She uses Flonase intermittently when she has pressure. Ears feel good currently. Using hearing aids bilaterally.     Today, she presents with 2 month history of aural fullness and possible decreased hearing on the left side. She denies any pain or drainage. No fever or chills. No other acute change.     Past Medical History:  She has a past medical history of Acid reflux, HL (hearing loss) (2001), HTN (hypertension), and Type 2 diabetes mellitus.    Surgical History:  She has a past surgical history that includes Colonoscopy; Other surgical history; Other surgical history; Other surgical history; Other surgical history; Other surgical history; Tympanostomy tube placement (1976); and Mastoidectomy (2001).     Social History:  She  reports that she has never smoked. She has never been exposed to tobacco smoke. She has never used smokeless tobacco. She reports that she does not currently use alcohol. She reports that she does not use drugs.    Family History:  family history includes Diabetes in her sister and sister; Hearing loss in her mother; Heart failure in her mother; Hypertension in her mother; Stroke in her father.     Medications:  Current Outpatient Medications   Medication Instructions   • acetaminophen (TYLENOL) 650 mg, oral, Every 6 hours PRN   • albuterol 90 mcg/actuation inhaler INHALE 2 PUFFS BY MOUTH EVERY 4 HOURS as instructed AS NEEDED FOR WHEEZING, SHORTNESS OF BREATH   • azelastine (Astelin) 137 mcg (0.1 %) nasal spray 2 sprays, Each Nostril, 2 times daily, Use in each nostril as directed   • blood sugar diagnostic strip Use with blood glucose test once daily   • ciprofloxacin-dexamethasone (Ciprodex) otic suspension 5 drops, Left Ear, Daily, Start drops to the left ear 1 week before your follow-up appointment with Dr. Javier   • Embrace Lancets 30 gauge misc test BLOOD SUGAR ONCE DAILY   • fluticasone (Flonase) 50 mcg/actuation nasal spray 2 sprays, Each Nostril, Daily, Shake gently. Before first use, prime pump. After use, clean tip and replace cap.   • hydroCHLOROthiazide (HYDRODiuril) 25 mg tablet 90   • ibuprofen 600 mg, oral, Every 6 hours PRN   • metFORMIN XR (GLUCOPHAGE-XR) 500 mg, oral, Daily RT   • metoprolol succinate XL (Toprol-XL) 100 mg 24 hr tablet 90   • omeprazole (PRILOSEC) 40 mg, Daily   • ondansetron ODT (ZOFRAN-ODT) 4 mg, oral, Every 8 hours PRN   • rosuvastatin (CRESTOR) 5 mg, Nightly   • traMADol (ULTRAM) 50 mg, oral, Every 4 hours PRN      Allergies:  Patient has no known allergies.    Review of Systems:   A comprehensive 10-point review of systems was obtained including constitutional, neurological, HEENT, pulmonary, cardiovascular, genito-urinary, and other pertinent systems and was negative except  as noted in the HPI.     Objective  Physical Exam:  Last Recorded Vitals: Blood pressure 177/76, pulse 51, temperature 36.4 °C (97.6 °F).    On physical exam, the patient is a well-nourished, well-developed patient, in no acute distress, able to communicate without assistance in English language. Head and face is atraumatic and normocephalic. Salivary glands are intact. Facial strength is symmetrical bilaterally.       On ear examination:  Right ear: The patient has an open and patent ear canal. The tympanic membrane is intact.  AC>BC  Left ear: The patient has an open and patent ear canal. Firm crust present on infrerior aspect of EAC, partially removed. The jason tympanum is intact. No evidence of infection  BC>AC  The Nelson is left    On vestibular exam, the patient has no spontaneous nystagmus    On neuro exam, the patient is alert and oriented x3, cranial nerves are grossly intact, cerebellar exam is normal.      The rest of the exam, including anterior rhinoscopy, oropharyngeal exam, neck exam, and cardiovascular exam, were normal including no palpable lymphadenopathies, thyroid in the midline position, normal pulses, and normal chest excursion.         Reviewed Results:  Audiology Testing:  I personally reviewed the audiogram from 3/26/25 that showed:         I personally reviewed the audiogram from 10/2024 that showed:   Right ear: moderate downsloping to severe sensorineural hearing loss. Discrimination: 80%   Left ear: moderate downsloping to severe sensorineural hearing loss with clsure of the air-bone gap within 10dB . Discrimination: 84%         I personally reviewed the audiogram from 04/2024 which showed a moderate severe sloping to severe mixed hearing loss bilateral with  15 dB of air-bone gap in the low frequencies. She has 44% discrimination on the left and 84% on the right.       I personally reviewed the audiogram from 01/2024 from Dr. Zuleta which showed a moderate severe sloping to severe mixed  hearing loss in the left ear. And 20-25 dB of air-bone gap. The right ear was not tested.          Assessment/Plan  In summary, Wendy Ayala is a 68 y.o. 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 She has a history of encephalocele and CSF leak repair by Dr. Greenwood on right.    She is now status post left sided tympanoplasty with composite cartilage graft on 5/16/24. She healed well.     On 08/2024 there was an area of the EAC which had not epithelialized and gentian violet was applied. She had fluid behind the TM. For that reason she was started on Flonase with resolution of her symptoms.    He hearing had improved and we achieved closure of the ABG within 5 dB. She had a little bit of exposed bone and I refreshed the edges of the skin on the ear canal.     She presents today with left ear fullness and possible effusion which is supported by tuning fork exam. She had been started on flonase/asteline topical sprays but did not use them for a significant time and is not currently using them. She has had good response to them in the past.    -Restart topical nasal sprays.   -Follow up in 3 months. Will determine if conductive hearing loss remains present and possible need for imaging or surgery      Dangelo Kay MD PGY5  ENT      __________________________________________________  Richie Javier MD  Professor and Chief   Otology/Neurotology/Lateral Skull-Base Surgery   [facility]    Scribe Attestation  By signing my name below, I, Dangelo Kay MD, Scribe   attest that this documentation has been prepared under the direction and in the presence of Richie Rasmussen MD.

## 2025-10-03 ENCOUNTER — APPOINTMENT (OUTPATIENT)
Dept: OTOLARYNGOLOGY | Facility: CLINIC | Age: 70
End: 2025-10-03
Payer: MEDICARE

## 2026-04-22 ENCOUNTER — APPOINTMENT (OUTPATIENT)
Dept: OTOLARYNGOLOGY | Facility: CLINIC | Age: 71
End: 2026-04-22
Payer: MEDICARE

## 2026-04-22 ENCOUNTER — APPOINTMENT (OUTPATIENT)
Dept: AUDIOLOGY | Facility: CLINIC | Age: 71
End: 2026-04-22
Payer: MEDICARE

## (undated) DEVICE — BALL, DIAMOND COARSE 3MM STANDARD

## (undated) DEVICE — HOLSTER, JET SAFETY

## (undated) DEVICE — CUP, SOLUTION

## (undated) DEVICE — TOWEL, SURGICAL, NEURO, O/R, 16 X 26, BLUE, STERILE

## (undated) DEVICE — GOWN, SURGICAL, REINFORCED, XLARGE, X-LONG, STERILE

## (undated) DEVICE — DRAPE, MICROSCOPE, W/REMOVABLE LENS

## (undated) DEVICE — COMB, HAIR, 7 IN, PLASTIC, BLACK

## (undated) DEVICE — TAPE, SILK, DURAPORE, 3 IN X 10 YD, LF

## (undated) DEVICE — BALL, DIAMOND COARSE 2MM STANDARD

## (undated) DEVICE — NEEDLE, HYPODERMIC, MONOJECT, 27 G X 1.5 IN

## (undated) DEVICE — MANIFOLD, 4 PORT NEPTUNE STANDARD

## (undated) DEVICE — SYRINGE, 1 CC, LUER LOCK

## (undated) DEVICE — SYRINGE, MONOJECT, LUER LOCK, 3 CC, LF

## (undated) DEVICE — Device

## (undated) DEVICE — STOCKINETTE, IMPERVIOUS, 12 X 48 IN, STERILE

## (undated) DEVICE — PACKING, PLAIN, CURITY, 0.25 IN X 5 YD, STERILE

## (undated) DEVICE — PROTECTOR, NERVE, ULNAR, PINK

## (undated) DEVICE — CLEANER, WIPE, INSTRUMENT, 3.25 X 3.25 IN

## (undated) DEVICE — GLOVE, SURGICAL, PROTEXIS PI , 7.5, PF, LF

## (undated) DEVICE — CATHETER, URETHRAL, MALECOT, 4 WING, 14 FR, LATEX

## (undated) DEVICE — DRAPE, SURGICAL, OTOLOGY GLASSCOCK